# Patient Record
Sex: MALE | Race: WHITE | NOT HISPANIC OR LATINO
[De-identification: names, ages, dates, MRNs, and addresses within clinical notes are randomized per-mention and may not be internally consistent; named-entity substitution may affect disease eponyms.]

---

## 2017-04-04 ENCOUNTER — APPOINTMENT (OUTPATIENT)
Dept: PULMONOLOGY | Facility: CLINIC | Age: 72
End: 2017-04-04

## 2017-04-04 VITALS
OXYGEN SATURATION: 98 % | DIASTOLIC BLOOD PRESSURE: 80 MMHG | HEIGHT: 68 IN | RESPIRATION RATE: 17 BRPM | BODY MASS INDEX: 23.49 KG/M2 | SYSTOLIC BLOOD PRESSURE: 110 MMHG | WEIGHT: 155 LBS | HEART RATE: 77 BPM

## 2017-07-25 ENCOUNTER — APPOINTMENT (OUTPATIENT)
Dept: PULMONOLOGY | Facility: CLINIC | Age: 72
End: 2017-07-25

## 2017-07-25 VITALS
BODY MASS INDEX: 25.27 KG/M2 | DIASTOLIC BLOOD PRESSURE: 80 MMHG | OXYGEN SATURATION: 97 % | SYSTOLIC BLOOD PRESSURE: 110 MMHG | WEIGHT: 161 LBS | HEART RATE: 90 BPM | HEIGHT: 67 IN | RESPIRATION RATE: 15 BRPM

## 2017-10-24 ENCOUNTER — APPOINTMENT (OUTPATIENT)
Dept: PULMONOLOGY | Facility: CLINIC | Age: 72
End: 2017-10-24
Payer: MEDICARE

## 2017-10-24 VITALS
WEIGHT: 161 LBS | OXYGEN SATURATION: 97 % | RESPIRATION RATE: 17 BRPM | HEART RATE: 103 BPM | BODY MASS INDEX: 25.27 KG/M2 | HEIGHT: 67 IN | DIASTOLIC BLOOD PRESSURE: 70 MMHG | SYSTOLIC BLOOD PRESSURE: 120 MMHG

## 2017-10-24 PROCEDURE — 99214 OFFICE O/P EST MOD 30 MIN: CPT | Mod: 25

## 2017-10-24 PROCEDURE — 94010 BREATHING CAPACITY TEST: CPT

## 2018-02-27 ENCOUNTER — APPOINTMENT (OUTPATIENT)
Dept: PULMONOLOGY | Facility: CLINIC | Age: 73
End: 2018-02-27
Payer: MEDICARE

## 2018-02-27 VITALS
OXYGEN SATURATION: 98 % | RESPIRATION RATE: 17 BRPM | SYSTOLIC BLOOD PRESSURE: 120 MMHG | BODY MASS INDEX: 25.27 KG/M2 | HEIGHT: 67 IN | WEIGHT: 161 LBS | HEART RATE: 70 BPM | DIASTOLIC BLOOD PRESSURE: 70 MMHG

## 2018-02-27 PROCEDURE — 94010 BREATHING CAPACITY TEST: CPT | Mod: 59

## 2018-02-27 PROCEDURE — 99214 OFFICE O/P EST MOD 30 MIN: CPT | Mod: 25

## 2018-02-27 PROCEDURE — 94618 PULMONARY STRESS TESTING: CPT

## 2018-06-27 ENCOUNTER — APPOINTMENT (OUTPATIENT)
Dept: PULMONOLOGY | Facility: CLINIC | Age: 73
End: 2018-06-27
Payer: MEDICARE

## 2018-06-27 ENCOUNTER — NON-APPOINTMENT (OUTPATIENT)
Age: 73
End: 2018-06-27

## 2018-06-27 VITALS
WEIGHT: 161 LBS | BODY MASS INDEX: 25.27 KG/M2 | HEART RATE: 85 BPM | DIASTOLIC BLOOD PRESSURE: 80 MMHG | HEIGHT: 67 IN | SYSTOLIC BLOOD PRESSURE: 110 MMHG | OXYGEN SATURATION: 98 % | RESPIRATION RATE: 17 BRPM

## 2018-06-27 PROCEDURE — 94010 BREATHING CAPACITY TEST: CPT

## 2018-06-27 PROCEDURE — 99214 OFFICE O/P EST MOD 30 MIN: CPT | Mod: 25

## 2018-06-27 RX ORDER — METHYLPREDNISOLONE 4 MG/1
4 TABLET ORAL
Qty: 21 | Refills: 0 | Status: DISCONTINUED | COMMUNITY
Start: 2018-01-02

## 2018-06-27 RX ORDER — TRAMADOL HYDROCHLORIDE 50 MG/1
50 TABLET, COATED ORAL
Qty: 30 | Refills: 0 | Status: DISCONTINUED | COMMUNITY
Start: 2018-01-03

## 2018-10-17 ENCOUNTER — APPOINTMENT (OUTPATIENT)
Dept: PULMONOLOGY | Facility: CLINIC | Age: 73
End: 2018-10-17
Payer: MEDICARE

## 2018-10-17 VITALS
DIASTOLIC BLOOD PRESSURE: 73 MMHG | SYSTOLIC BLOOD PRESSURE: 108 MMHG | BODY MASS INDEX: 24.8 KG/M2 | HEIGHT: 67 IN | HEART RATE: 89 BPM | WEIGHT: 158 LBS | OXYGEN SATURATION: 97 %

## 2018-10-17 PROCEDURE — 99214 OFFICE O/P EST MOD 30 MIN: CPT | Mod: 25

## 2018-10-17 PROCEDURE — 94729 DIFFUSING CAPACITY: CPT

## 2018-10-17 PROCEDURE — 94727 GAS DIL/WSHOT DETER LNG VOL: CPT

## 2018-10-17 PROCEDURE — 94010 BREATHING CAPACITY TEST: CPT

## 2018-10-17 NOTE — PHYSICAL EXAM
[General Appearance - Well Developed] : well developed [Normal Appearance] : normal appearance [Well Groomed] : well groomed [General Appearance - Well Nourished] : well nourished [No Deformities] : no deformities [General Appearance - In No Acute Distress] : no acute distress [Normal Conjunctiva] : the conjunctiva exhibited no abnormalities [Eyelids - No Xanthelasma] : the eyelids demonstrated no xanthelasmas [Normal Oropharynx] : normal oropharynx [II] : II [Neck Appearance] : the appearance of the neck was normal [Neck Cervical Mass (___cm)] : no neck mass was observed [Jugular Venous Distention Increased] : there was no jugular-venous distention [Thyroid Diffuse Enlargement] : the thyroid was not enlarged [Thyroid Nodule] : there were no palpable thyroid nodules [Heart Rate And Rhythm] : heart rate and rhythm were normal [Heart Sounds] : normal S1 and S2 [Murmurs] : no murmurs present [Respiration, Rhythm And Depth] : normal respiratory rhythm and effort [Exaggerated Use Of Accessory Muscles For Inspiration] : no accessory muscle use [Auscultation Breath Sounds / Voice Sounds] : lungs were clear to auscultation bilaterally [Abdomen Soft] : soft [Abdomen Tenderness] : non-tender [Abdomen Mass (___ Cm)] : no abdominal mass palpated [Abnormal Walk] : normal gait [Gait - Sufficient For Exercise Testing] : the gait was sufficient for exercise testing [Nail Clubbing] : no clubbing of the fingernails [Cyanosis, Localized] : no localized cyanosis [Petechial Hemorrhages (___cm)] : no petechial hemorrhages [Skin Color & Pigmentation] : normal skin color and pigmentation [] : no rash [No Venous Stasis] : no venous stasis [Skin Lesions] : no skin lesions [No Skin Ulcers] : no skin ulcer [No Xanthoma] : no  xanthoma was observed [Deep Tendon Reflexes (DTR)] : deep tendon reflexes were 2+ and symmetric [Sensation] : the sensory exam was normal to light touch and pinprick [No Focal Deficits] : no focal deficits [Oriented To Time, Place, And Person] : oriented to person, place, and time [Impaired Insight] : insight and judgment were intact [Affect] : the affect was normal [FreeTextEntry1] : I:E ratio 1:3; clear

## 2018-10-17 NOTE — REASON FOR VISIT
[Follow-Up] : a follow-up visit [FreeTextEntry1] : Afib, atrial flutter, heart disease, SERAFIN, PAH, RAD, SOB,

## 2018-10-17 NOTE — ADDENDUM
[FreeTextEntry1] : All medical record entries made by elana Mckeon were at Dr. Pato Knight's, direction and personally dictated by me on (10/17/2018). I have reviewed the chart and agree that the record accurately reflects my personal performance of the history, physical exam, assessment and plan. I have also personally directed, reviewed, and agree with the discharge instructions.

## 2018-10-17 NOTE — PROCEDURE
[FreeTextEntry1] : PFT - spi reveals normal flows; FEV1 is 3.16 which is 102% of predicted, normal flow volume loop. Diffusion reveals as normal with a DLCO of 19.6 which is 108% of predicted.\par \par ull pft\par normal\par fec 3.16 102%\par lv normal\par \par diffusion normal 19.6 108% with normal flow volume loop

## 2018-10-17 NOTE — ASSESSMENT
[FreeTextEntry1] : Mr. Knapp is doing well from a pulmonary perspective. His SOB is currently stable and is multifactorial due to:\par -exercise\par -PAH\par -RADS\par -?cardiac disease\par -poor mechanics of breathing\par \par problem 1: history of PAH, exercise induced \par -add Xopenex trial 2 puffs pre exercise\par -previously failed: Spiriva, Incruse, StioltoRevatio, Adcirca, Calcium channel blockers, and Tracleer\par -considering Uptavi (IP Antagonist)\par \par -Disorder of the pulmonary arteries, important to distinguish the difference between pulmonary arterial hypertension which is idiopathic to secondary pulmonary hypertension which is related to heart disease being diastolic dysfunction or congestive heart failure. Diagnostics include an initial echocardiogram evaluating the pulmonary artery pressures, if this is abnormal, to proceed with a VQ scan as well as a CTPA and an eventual right heart catheterization (No medication can be prescribed until the right heart catheterization). If present, the evaluation will include rheumatological blood testing, HIV testing, and potential evaluation for cirrhosis. Drug classes include PED5 (Revatio, Adcirca) ETRA (Tracleer, Macitentan, Letairis), Soluble guanylate cyclase (Adempas) Prostacyclins (Uptravi, Tyavso, Ventavis, Remodulin, or Orenitram derivatives). \par \par problem 2: RADS; \par -add trial of Spiriva 1 inhalation QD for 1 week, trial of Incruse 1 inhalation daily for 1 week, trial of Stiolto 2 inhalations daily for 1 week\par -failed Utibron,Bevespi and Seebri\par -continue to use Ventolin PRN and before exercise\par \par -Inhaler technique reviewed as well as oral hygiene techniques reviewed with patient. Avoidance of cold air, extremes of temperature, rescue inhaler should be used before exercise. Order of medication reviewed with patient. Recommended use of a cool mist humidifier in the bedroom. \par \par problem 3: prior CTA\par -ruled out pulmonary embolism and any underlying disease\par \par problem 4: cardiac component \par -continue to follow up with cardiologist\par -his recent pulmonary cardiac stress test was normal\par \par problem 5: SERAFIN\par -continue to use Oxy-Aid by Respitec\par -recommended to try Boron supplements\par -Sleep apnea is associated with adverse clinical consequences which an affect most organ systems. Cardiovascular disease risk includes arrhythmias, atrial fibrillation, hypertension, coronary artery disease, and stroke. Metabolic disorders include diabetes type 2, non-alcoholic fatty liver disease. Mood disorder especially depression; and cognitive decline especially in the elderly. Associations with chronic reflux/Marques’s esophagus some but not all inclusive.\par -Reasons include arousal consistent with hypopnea; respiratory events most prominent in REM sleep or supine position; therefore sleep staging and body position are important for accurate diagnosis and estimation of AHI. \par -Treatment options discussed including CPAP/BiPAP machine, oral appliance, ProVent therapy, Oxy-Aid by Respitec, new technologies, or positional sleep.Recommended use of the CPAP machine for moderate (AHI >15), moderate to severe (AHI 15-30) and severe patients (AHI > 30). Recommended weight loss which can reduce AHI especially in weight loss of greater than 5% of BMI. Positional sleep is recommended in those with low AHI, low-moderate BMI, and younger age. For severe sleep apnea, the hypoglossal nerve stimulator was recommended as well. \par \par problem 6: exercise\par -recommended to transition from treadmill/swimming/tread water\par -recommended to use natural protein\par \par problem 7: exercise induced pulmonary hypertension\par -he may qualify for prostacyclin drugs including Uptavi or Orenitram \par -considering Uptavi\par \par Problem 8: muscle cramps\par -continue MyoSedate\par -continue to use Optimal Electrolytes (pre-exercise)\par -recommended Topricin ointment \par \par problem 9: poor mechanics of breathing\par -Proper breathing techniques were reviewed with an emphasis of exhalation. Patient instructed to breath in for 1 second and out for four seconds. Patient was encouraged to not talk while walking. \par \par -recommended to hydrate before exercise\par -recommended to use a heart rate monitor while exercising\par -recommended to use a natural protein powder \par \par problem 10: health maintenance \par -recommended to try optimal electrolytes\par -recommended yearly flu shot after October 15, refused today\par -recommended strep pneumonia vaccines: Prevnar-13 vaccine, followed by Pneumo vaccine 23 one year following\par -recommended early intervention for URIs\par -recommended regular osteoporosis evaluations\par -recommended early dermatological evaluations\par -recommended after the age of 50 to the age of 70, colonoscopy every 5 years \par  \par F/U in 4 months \par He is encouraged to call with any changes, concerns, or questions. \par

## 2019-02-20 ENCOUNTER — APPOINTMENT (OUTPATIENT)
Dept: PULMONOLOGY | Facility: CLINIC | Age: 74
End: 2019-02-20
Payer: MEDICARE

## 2019-02-20 ENCOUNTER — NON-APPOINTMENT (OUTPATIENT)
Age: 74
End: 2019-02-20

## 2019-02-20 VITALS
BODY MASS INDEX: 25.11 KG/M2 | SYSTOLIC BLOOD PRESSURE: 110 MMHG | OXYGEN SATURATION: 99 % | HEART RATE: 90 BPM | HEIGHT: 67 IN | WEIGHT: 160 LBS | RESPIRATION RATE: 17 BRPM | DIASTOLIC BLOOD PRESSURE: 70 MMHG

## 2019-02-20 PROCEDURE — 99214 OFFICE O/P EST MOD 30 MIN: CPT | Mod: 25

## 2019-02-20 PROCEDURE — 94010 BREATHING CAPACITY TEST: CPT

## 2019-02-20 NOTE — HISTORY OF PRESENT ILLNESS
[FreeTextEntry1] : Mr. Auguste is a 73 year old male with a history of Afib, atrial flutter, heart disease, SERAFIN, PAH, RAD, SOB, who now comes in for a follow up visit. His chief complaint is fatigue. \par - He reports getting tired a lot during the day recently. \par - He continues to have trouble sleeping as he usually sleeps 3 hours before getting up. \par - He uses theraworx for muscle relief. \par - He reports having a cold for about 2 weeks. \par - He exercises regularly but does report a 'funny feeling' when his pulse rises. \par - He denies any headaches, nausea, vomiting, fever, chills, sweats, chest pain, diarrhea, constipation, dysphagia, leg swelling, leg pain, itchy eyes, itchy ears, heartburn, reflux, or sour taste in the mouth.

## 2019-02-20 NOTE — ADDENDUM
[FreeTextEntry1] : Documented by Isaac Ch acting as a scribe for Dr. Pato Knight on 2/20/19.\par \par All medical record entries made by the Scribe were at my, Dr. Pato Knight's, direction and personally dictated by me on 2/20/19. I have reviewed the chart and agree that the record accurately reflects my personal performance of the history, physical exam, assessment and plan. I have also personally directed, reviewed, and agree with the discharge instructions.\par

## 2019-02-20 NOTE — PROCEDURE
[FreeTextEntry1] : PFT- spi reveals super normal flows; FEV1 3.50 was L which is 130% of predicted; normal flow volume loop\par

## 2019-02-20 NOTE — ASSESSMENT
[FreeTextEntry1] : Mr. Knapp has a h/o AF/flutter, RAD, RAH, and SERAFIN who is doing well from a pulmonary perspective. His SOB is currently stable and is multifactorial due to:\par -exercise\par -PAH\par -RADS\par -?cardiac disease\par -poor mechanics of breathing\par \par problem 1: history of PAH, exercise induced \par -add Xopenex trial 2 puffs pre exercise\par -previously failed: Spiriva, Incruse, StioltoRevatio, Adcirca, Calcium channel blockers, and Tracleer\par -considering Uptavi (IP Antagonist)\par \par -Disorder of the pulmonary arteries, important to distinguish the difference between pulmonary arterial hypertension which is idiopathic to secondary pulmonary hypertension which is related to heart disease being diastolic dysfunction or congestive heart failure. Diagnostics include an initial echocardiogram evaluating the pulmonary artery pressures, if this is abnormal, to proceed with a VQ scan as well as a CTPA and an eventual right heart catheterization (No medication can be prescribed until the right heart catheterization). If present, the evaluation will include rheumatological blood testing, HIV testing, and potential evaluation for cirrhosis. Drug classes include PED5 (Revatio, Adcirca) ETRA (Tracleer, Macitentan, Letairis), Soluble guanylate cyclase (Adempas) Prostacyclins (Uptravi, Tyavso, Ventavis, Remodulin, or Orenitram derivatives). \par \par problem 2: RADS; \par -add trial of Spiriva 1 inhalation QD for 1 week, trial of Incruse 1 inhalation daily for 1 week, trial of Stiolto 2 inhalations daily for 1 week\par -failed Utibron,Bevespi and Seebri\par -continue to use Ventolin PRN and before exercise\par \par -Inhaler technique reviewed as well as oral hygiene techniques reviewed with patient. Avoidance of cold air, extremes of temperature, rescue inhaler should be used before exercise. Order of medication reviewed with patient. Recommended use of a cool mist humidifier in the bedroom. \par \par problem 3: prior CTA\par -ruled out pulmonary embolism and any underlying disease\par \par problem 4: cardiac component \par -continue to follow up with cardiologist\par -his recent pulmonary cardiac stress test was normal\par \par problem 5: SERAFIN\par -continue to use Oxy-Aid by Respitec\par -recommended to try Boron supplements 6 mg BID\par -Sleep apnea is associated with adverse clinical consequences which an affect most organ systems. Cardiovascular disease risk includes arrhythmias, atrial fibrillation, hypertension, coronary artery disease, and stroke. Metabolic disorders include diabetes type 2, non-alcoholic fatty liver disease. Mood disorder especially depression; and cognitive decline especially in the elderly. Associations with chronic reflux/Marques’s esophagus some but not all inclusive.\par -Reasons include arousal consistent with hypopnea; respiratory events most prominent in REM sleep or supine position; therefore sleep staging and body position are important for accurate diagnosis and estimation of AHI. \par -Treatment options discussed including CPAP/BiPAP machine, oral appliance, ProVent therapy, Oxy-Aid by Respitec, new technologies, or positional sleep.Recommended use of the CPAP machine for moderate (AHI >15), moderate to severe (AHI 15-30) and severe patients (AHI > 30). Recommended weight loss which can reduce AHI especially in weight loss of greater than 5% of BMI. Positional sleep is recommended in those with low AHI, low-moderate BMI, and younger age. For severe sleep apnea, the hypoglossal nerve stimulator was recommended as well. \par \par problem 6: exercise\par -recommended to transition from treadmill/swimming/tread water\par -recommended to use natural protein\par \par problem 7: exercise induced pulmonary hypertension\par -he may qualify for prostacyclin drugs including Uptavi or Orenitram \par -considering Uptavi\par \par Problem 8: muscle cramps\par -continue theraworx \par -continue to use Optimal Electrolytes (pre-exercise)\par -recommended Topricin ointment \par \par problem 9: poor mechanics of breathing\par -Proper breathing techniques were reviewed with an emphasis of exhalation. Patient instructed to breath in for 1 second and out for four seconds. Patient was encouraged to not talk while walking. \par \par -recommended to hydrate before exercise\par -recommended to use a heart rate monitor while exercising\par -recommended to use a natural protein powder \par \par problem 10: health maintenance \par -recommended to try optimal electrolytes \par -recommended yearly flu shot after October 15, refused \par -recommended strep pneumonia vaccines: Prevnar-13 vaccine, followed by Pneumo vaccine 23 one year following\par -recommended early intervention for URIs\par -recommended regular osteoporosis evaluations\par -recommended early dermatological evaluations\par -recommended after the age of 50 to the age of 70, colonoscopy every 5 years \par - recommend Vitamin D supplements \par - recommend probiotic\par  \par F/U in 4 months \par He is encouraged to call with any changes, concerns, or questions. \par

## 2019-02-21 ENCOUNTER — TRANSCRIPTION ENCOUNTER (OUTPATIENT)
Age: 74
End: 2019-02-21

## 2019-04-26 ENCOUNTER — TRANSCRIPTION ENCOUNTER (OUTPATIENT)
Age: 74
End: 2019-04-26

## 2019-05-10 ENCOUNTER — RECORD ABSTRACTING (OUTPATIENT)
Age: 74
End: 2019-05-10

## 2019-05-11 DIAGNOSIS — I47.1 SUPRAVENTRICULAR TACHYCARDIA: ICD-10-CM

## 2019-05-11 DIAGNOSIS — R55 SYNCOPE AND COLLAPSE: ICD-10-CM

## 2019-05-11 DIAGNOSIS — D75.89 OTHER SPECIFIED DISEASES OF BLOOD AND BLOOD-FORMING ORGANS: ICD-10-CM

## 2019-05-11 DIAGNOSIS — Z87.09 PERSONAL HISTORY OF OTHER DISEASES OF THE RESPIRATORY SYSTEM: ICD-10-CM

## 2019-05-11 DIAGNOSIS — L98.9 DISORDER OF THE SKIN AND SUBCUTANEOUS TISSUE, UNSPECIFIED: ICD-10-CM

## 2019-05-11 DIAGNOSIS — Z86.79 PERSONAL HISTORY OF OTHER DISEASES OF THE CIRCULATORY SYSTEM: ICD-10-CM

## 2019-05-11 DIAGNOSIS — Z86.69 PERSONAL HISTORY OF OTHER DISEASES OF THE NERVOUS SYSTEM AND SENSE ORGANS: ICD-10-CM

## 2019-05-11 DIAGNOSIS — Z00.00 ENCOUNTER FOR GENERAL ADULT MEDICAL EXAMINATION W/OUT ABNORMAL FINDINGS: ICD-10-CM

## 2019-05-11 DIAGNOSIS — I25.10 ATHEROSCLEROTIC HEART DISEASE OF NATIVE CORONARY ARTERY W/OUT ANGINA PECTORIS: ICD-10-CM

## 2019-05-11 DIAGNOSIS — Z86.39 PERSONAL HISTORY OF OTHER ENDOCRINE, NUTRITIONAL AND METABOLIC DISEASE: ICD-10-CM

## 2019-05-11 DIAGNOSIS — I45.5 OTHER SPECIFIED HEART BLOCK: ICD-10-CM

## 2019-05-11 DIAGNOSIS — M48.02 SPINAL STENOSIS, CERVICAL REGION: ICD-10-CM

## 2019-05-15 ENCOUNTER — APPOINTMENT (OUTPATIENT)
Dept: ELECTROPHYSIOLOGY | Facility: CLINIC | Age: 74
End: 2019-05-15
Payer: MEDICARE

## 2019-05-15 VITALS
HEART RATE: 107 BPM | BODY MASS INDEX: 24.55 KG/M2 | WEIGHT: 162 LBS | SYSTOLIC BLOOD PRESSURE: 124 MMHG | HEIGHT: 68 IN | DIASTOLIC BLOOD PRESSURE: 80 MMHG

## 2019-05-15 PROCEDURE — 93000 ELECTROCARDIOGRAM COMPLETE: CPT

## 2019-05-15 PROCEDURE — 99204 OFFICE O/P NEW MOD 45 MIN: CPT

## 2019-05-15 NOTE — REVIEW OF SYSTEMS
[Fever] : no fever [Chills] : no chills [Feeling Fatigued] : feeling fatigued [see HPI] : see HPI [Shortness Of Breath] : no shortness of breath [Dyspnea on exertion] : not dyspnea during exertion [Chest Pain] : no chest pain [Lower Ext Edema] : no extremity edema [Palpitations] : no palpitations [Dizziness] : dizziness [Negative] : Heme/Lymph

## 2019-05-15 NOTE — HISTORY OF PRESENT ILLNESS
[FreeTextEntry1] : 73 year old gentleman with history of pulmonary fibrosis and pulmonary hypertension, sleep apnea, persistent atrial fibrillation, and atrial flutter s/p ablation in 2012 (Dr Olguin) presenting for evaluation of bradycardia. At time of his flutter ablation paroxysms of AF were also noted, and he subsequently developed persistent atrial fibrillation. Holter monitor in 8/2016 revealed persistent atrial fibrillation/flutter with average HR 81 bpm (range  bpm). He has not been on anticoagulation as he has refused, as he believes he had increased infectious and bleeding risks. A Holter monitor 3/13/19 revealed persistent atrial fibrillation (average rate 85 bpm, range  bpm), NSVT up to 4 beat, and a ventricular pause of 4 seconds, which was associated with lightheadedness (though pt now denies).  He is now referred for consideration of ppm implant. \par He reports occasional episodes of sudden lightheadedness which can last for up to 10 seconds, and has on a couple occasions been associated with presyncope, but no lizandro syncope. He feels fatigued, and has gradually worsening exertional dyspnea, but does continue to try to run, which he can not do as vigorously and previously. \par He has had a normal stress test and normal LV function on TTE.\par Of note, he does not believe he has been in persistent AF all the time, and states that as an  he understands his electrical system and is somewhat distrustful of the medical advice he has received.  \par Current medication includes ASA 81mg qd only. \par

## 2019-05-15 NOTE — REASON FOR VISIT
[Atrial Fibrillation] : atrial fibrillation [Consultation] : a consultation regarding [FreeTextEntry1] : ref Dr Wilkinson

## 2019-05-15 NOTE — DISCUSSION/SUMMARY
[FreeTextEntry1] : 73 year old gentleman with history of pulmonary fibrosis and pulmonary hypertension, sleep apnea, persistent atrial fibrillation, and atrial flutter s/p ablation in 2012 (Dr Olguin) presenting for evaluation of bradycardia. By records he has had persistent AF for several years, and has not been on rate control. Though his heart rates are generally controlled he does have periods of significant tachycardia and significant bradycardia, and has had a pause of 4 seconds noted on Holter. He does have periods of dizziness and presyncope, which may be related to such pauses and bradycardia. I therefore believe a ppm (as well as some rate control) would be reasonable if pt is agreeable. I also suggested consideration of ILR implant for long-term HR monitoring and correlation with his symptoms, to clarify that his dizziness is related to periodic bradycardia. He does not want to proceed with these options at this time, and states he will monitor his symptoms and return as needed.\par We also discussed the thromboembolic risk (including risk of major stroke) associated with AF, and he does have an elevated risk (CHADSVASc at least 2). We reviewed options for anticoagulation as well as left atrial appendage occlusion, and risks and benefits of these approaches. He expressed understanding, but does still want to continue without anticoagulation. \par -EP followup as needed if pt agreeable. \par

## 2019-05-15 NOTE — PHYSICAL EXAM
[General Appearance - Well Developed] : well developed [General Appearance - In No Acute Distress] : no acute distress [General Appearance - Well Nourished] : well nourished [Normal Oral Mucosa] : normal oral mucosa [Normal Conjunctiva] : the conjunctiva exhibited no abnormalities [Normal Oropharynx] : normal oropharynx [Normal Jugular Venous V Waves Present] : normal jugular venous V waves present [Heart Sounds] : normal S1 and S2 [Murmurs] : no murmurs present [Edema] : no peripheral edema present [Arterial Pulses Normal] : the arterial pulses were normal [Bradycardic ___] : the heart rate was bradycardic at [unfilled] bpm [Respiration, Rhythm And Depth] : normal respiratory rhythm and effort [] : no respiratory distress [Bowel Sounds] : normal bowel sounds [FreeTextEntry1] : coarse breath sounds [Abdomen Soft] : soft [Abnormal Walk] : normal gait [Abdomen Tenderness] : non-tender [Skin Color & Pigmentation] : normal skin color and pigmentation [Cyanosis, Localized] : no localized cyanosis [Skin Turgor] : normal skin turgor [Nail Clubbing] : no clubbing of the fingernails [Oriented To Time, Place, And Person] : oriented to person, place, and time

## 2019-06-26 ENCOUNTER — APPOINTMENT (OUTPATIENT)
Dept: PULMONOLOGY | Facility: CLINIC | Age: 74
End: 2019-06-26
Payer: MEDICARE

## 2019-06-26 ENCOUNTER — NON-APPOINTMENT (OUTPATIENT)
Age: 74
End: 2019-06-26

## 2019-06-26 VITALS
WEIGHT: 160 LBS | OXYGEN SATURATION: 99 % | DIASTOLIC BLOOD PRESSURE: 60 MMHG | HEIGHT: 68 IN | SYSTOLIC BLOOD PRESSURE: 110 MMHG | HEART RATE: 93 BPM | BODY MASS INDEX: 24.25 KG/M2 | RESPIRATION RATE: 17 BRPM

## 2019-06-26 DIAGNOSIS — I48.92 UNSPECIFIED ATRIAL FLUTTER: ICD-10-CM

## 2019-06-26 PROCEDURE — 99214 OFFICE O/P EST MOD 30 MIN: CPT | Mod: 25

## 2019-06-26 PROCEDURE — 94618 PULMONARY STRESS TESTING: CPT

## 2019-06-26 PROCEDURE — 94010 BREATHING CAPACITY TEST: CPT

## 2019-06-26 NOTE — HISTORY OF PRESENT ILLNESS
[FreeTextEntry1] : Mr. Knapp is a 73 year old male with a history of allergic rhinitis, SERAFIN, PAH, RAD, and SOB presenting to the office today for a follow up visit. His chief complaint is difficulty exercising / SOB\par -he states that he has been trying to exercise more with running and swimming. he states that his heart rate will become elevated after running for a short time, and his chest will become heavy / tight  \par -he states that his bowels have been regular, and he has been staying well hydrated\par -he reports that his sense of taste and smell have been good \par -he does not believe that any of his inhalers helped his breathing\par -he has been sleeping for about 5 hours per night\par -he denies any headaches, nausea, vomiting, fever, chills, sweats, chest pain, diarrhea, constipation, dysphagia, dizziness, leg swelling, leg pain, itchy eyes, itchy ears, heartburn, reflux, or sour taste in the mouth.

## 2019-06-26 NOTE — ADDENDUM
[FreeTextEntry1] : All medical record entries made by elana Gallagher were at Dr. Pato Knight's, direction and personally dictated by me on 06/26/2019. I have reviewed the chart and agree that the record accurately reflects my personal performance of the history, physical exam, assessment and plan. I have also personally directed, reviewed, and agree with the discharge instructions.

## 2019-06-26 NOTE — PHYSICAL EXAM
[General Appearance - Well Developed] : well developed [Well Groomed] : well groomed [Normal Appearance] : normal appearance [General Appearance - Well Nourished] : well nourished [No Deformities] : no deformities [Normal Conjunctiva] : the conjunctiva exhibited no abnormalities [General Appearance - In No Acute Distress] : no acute distress [Eyelids - No Xanthelasma] : the eyelids demonstrated no xanthelasmas [Normal Oropharynx] : normal oropharynx [Neck Cervical Mass (___cm)] : no neck mass was observed [Jugular Venous Distention Increased] : there was no jugular-venous distention [Neck Appearance] : the appearance of the neck was normal [Heart Rate And Rhythm] : heart rate and rhythm were normal [Thyroid Diffuse Enlargement] : the thyroid was not enlarged [Thyroid Nodule] : there were no palpable thyroid nodules [Heart Sounds] : normal S1 and S2 [Murmurs] : no murmurs present [Exaggerated Use Of Accessory Muscles For Inspiration] : no accessory muscle use [Respiration, Rhythm And Depth] : normal respiratory rhythm and effort [Auscultation Breath Sounds / Voice Sounds] : lungs were clear to auscultation bilaterally [Abdomen Soft] : soft [Abdomen Tenderness] : non-tender [Abnormal Walk] : normal gait [Abdomen Mass (___ Cm)] : no abdominal mass palpated [Gait - Sufficient For Exercise Testing] : the gait was sufficient for exercise testing [Nail Clubbing] : no clubbing of the fingernails [Cyanosis, Localized] : no localized cyanosis [Petechial Hemorrhages (___cm)] : no petechial hemorrhages [Skin Color & Pigmentation] : normal skin color and pigmentation [No Venous Stasis] : no venous stasis [] : no rash [No Skin Ulcers] : no skin ulcer [Skin Lesions] : no skin lesions [No Xanthoma] : no  xanthoma was observed [No Focal Deficits] : no focal deficits [Sensation] : the sensory exam was normal to light touch and pinprick [Deep Tendon Reflexes (DTR)] : deep tendon reflexes were 2+ and symmetric [Oriented To Time, Place, And Person] : oriented to person, place, and time [Impaired Insight] : insight and judgment were intact [Affect] : the affect was normal [II] : II [FreeTextEntry1] : I:E ratio 1:3; clear

## 2019-06-26 NOTE — ASSESSMENT
[FreeTextEntry1] : Mr. Knapp has a h/o AF/flutter, RAD, RAH, and SERAFIN who is more compromised from a pulmonary perspective. \par \par His progressive SOB is currently stable and is multifactorial due to:\par -exercise\par -PAH\par -RADS\par -?cardiac disease\par -poor mechanics of breathing\par \par problem 1: history of PAH, exercise induced \par -continue Xopenex trial 2 puffs pre exercise\par -previously failed: Spiriva, Incruse, StioltoRevatio, Adcirca, Calcium channel blockers, and Tracleer\par -considering Uptavi (IP Antagonist)\par \par -Disorder of the pulmonary arteries, important to distinguish the difference between pulmonary arterial hypertension which is idiopathic to secondary pulmonary hypertension which is related to heart disease being diastolic dysfunction or congestive heart failure. Diagnostics include an initial echocardiogram evaluating the pulmonary artery pressures, if this is abnormal, to proceed with a VQ scan as well as a CTPA and an eventual right heart catheterization (No medication can be prescribed until the right heart catheterization). If present, the evaluation will include rheumatological blood testing, HIV testing, and potential evaluation for cirrhosis. Drug classes include PED5 (Revatio, Adcirca) ETRA (Tracleer, Macitentan, Letairis), Soluble guanylate cyclase (Adempas) Prostacyclins (Uptravi, Tyavso, Ventavis, Remodulin, or Orenitram derivatives). \par \par problem 2: RADS; \par -s/p Spiriva 1 inhalation QD for 1 week, trial of Incruse 1 inhalation daily for 1 week, trial of Stiolto 2 inhalations daily for 1 week\par -failed Utibron,Bevespi, Seebri, Spiriva, Incruse, Aiollo \par -continue to use Ventolin PRN and before exercise\par \par -Inhaler technique reviewed as well as oral hygiene techniques reviewed with patient. Avoidance of cold air, extremes of temperature, rescue inhaler should be used before exercise. Order of medication reviewed with patient. Recommended use of a cool mist humidifier in the bedroom. \par \par problem 3: prior CTA\par -ruled out pulmonary embolism and any underlying disease\par \par problem 4: cardiac component \par -continue to follow up with cardiologist - repeat cardiopulmonary stress test \par -his recent pulmonary cardiac stress test was normal\par \par problem 5: SERAFIN\par -continue to use Oxy-Aid by [facility]\par -recommended to try Boron supplements 6 mg BID\par -Sleep apnea is associated with adverse clinical consequences which an affect most organ systems. Cardiovascular disease risk includes arrhythmias, atrial fibrillation, hypertension, coronary artery disease, and stroke. Metabolic disorders include diabetes type 2, non-alcoholic fatty liver disease. Mood disorder especially depression; and cognitive decline especially in the elderly. Associations with chronic reflux/Marques’s esophagus some but not all inclusive.\par -Reasons include arousal consistent with hypopnea; respiratory events most prominent in REM sleep or supine position; therefore sleep staging and body position are important for accurate diagnosis and estimation of AHI. \par -Treatment options discussed including CPAP/BiPAP machine, oral appliance, ProVent therapy, Oxy-Aid by [facility], new technologies, or positional sleep.Recommended use of the CPAP machine for moderate (AHI >15), moderate to severe (AHI 15-30) and severe patients (AHI > 30). Recommended weight loss which can reduce AHI especially in weight loss of greater than 5% of BMI. Positional sleep is recommended in those with low AHI, low-moderate BMI, and younger age. For severe sleep apnea, the hypoglossal nerve stimulator was recommended as well. \par \par problem 6: exercise\par -recommended to transition from treadmill/swimming/tread water\par -recommended to use natural protein\par \par problem 7: exercise induced pulmonary hypertension\par -he may qualify for prostacyclin drugs including Uptavi or Orenitram \par -considering Uptavi (pending cardiopulmonary exercise stress test)\par \par Problem 8: muscle cramps (improved)\par -continue theraworx \par -continue to use Optimal Electrolytes (pre-exercise)\par -recommended Topricin ointment \par \par problem 9: poor mechanics of breathing\par -Proper breathing techniques were reviewed with an emphasis of exhalation. Patient instructed to breath in for 1 second and out for four seconds. Patient was encouraged to not talk while walking. \par \par -recommended to hydrate before exercise\par -recommended to use a heart rate monitor while exercising\par -recommended to use a natural protein powder \par \par problem 10: health maintenance \par -recommended to try optimal electrolytes \par -recommended yearly flu shot after October 15, refused \par -recommended strep pneumonia vaccines: Prevnar-13 vaccine, followed by Pneumo vaccine 23 one year following\par -recommended early intervention for URIs\par -recommended regular osteoporosis evaluations\par -recommended early dermatological evaluations\par -recommended after the age of 50 to the age of 70, colonoscopy every 5 years \par - recommend Vitamin D supplements \par - recommend probiotic\par  \par F/U in 4 months \par He is encouraged to call with any changes, concerns, or questions. \par

## 2019-06-26 NOTE — PROCEDURE
[FreeTextEntry1] : PFT - spi reveals normal flows; FEV1 is 3.57 which is 132% of predicted, normal flow volume loop\par \par 6 minute walk test reveals a low saturation of 97% with no evidence of dyspnea or fatigue; walked 472.7 meters \par

## 2019-07-08 ENCOUNTER — APPOINTMENT (OUTPATIENT)
Dept: PULMONOLOGY | Facility: CLINIC | Age: 74
End: 2019-07-08
Payer: MEDICARE

## 2019-07-08 PROCEDURE — 94621 CARDIOPULM EXERCISE TESTING: CPT

## 2019-07-08 PROCEDURE — 94375 RESPIRATORY FLOW VOLUME LOOP: CPT

## 2019-09-26 ENCOUNTER — APPOINTMENT (OUTPATIENT)
Dept: PULMONOLOGY | Facility: CLINIC | Age: 74
End: 2019-09-26
Payer: MEDICARE

## 2019-09-26 ENCOUNTER — NON-APPOINTMENT (OUTPATIENT)
Age: 74
End: 2019-09-26

## 2019-09-26 VITALS
HEART RATE: 67 BPM | BODY MASS INDEX: 25.55 KG/M2 | SYSTOLIC BLOOD PRESSURE: 120 MMHG | OXYGEN SATURATION: 97 % | HEIGHT: 66 IN | DIASTOLIC BLOOD PRESSURE: 70 MMHG | RESPIRATION RATE: 17 BRPM | WEIGHT: 159 LBS

## 2019-09-26 PROCEDURE — 99214 OFFICE O/P EST MOD 30 MIN: CPT | Mod: 25

## 2019-09-26 PROCEDURE — 94010 BREATHING CAPACITY TEST: CPT

## 2019-09-26 NOTE — REVIEW OF SYSTEMS
[Negative] : Sleep Disorder [Dizziness] : dizziness [As Noted in HPI] : as noted in HPI [Nasal Congestion] : no nasal congestion [Postnasal Drip] : no postnasal drip [Sinus Problems] : no sinus problems [Chest Discomfort] : no chest discomfort [Heartburn] : no heartburn [Reflux] : no reflux [Constipation] : no constipation [Dysphagia] : no dysphagia [Diarrhea] : no diarrhea

## 2019-09-26 NOTE — ASSESSMENT
[FreeTextEntry1] : Mr. Knapp has a h/o AF/flutter, macular pucker, allergy, low vitamin D, RAD, RAH, and SERAFIN who is now mildly compromised from a pulmonary perspective. \par \par His progressive SOB is currently stable and is multifactorial due to:\par -exercise\par -PAH\par -RADS\par -?cardiac disease\par -poor mechanics of breathing\par \par problem 1: history of PAH, exercise induced \par -continue Xopenex trial 2 puffs pre exercise, PRN\par -previously failed: Spiriva, Incruse, StioltoRevatio, Adcirca, Calcium channel blockers, and Tracleer\par -considering Uptavi (IP Antagonist)\par \par -Disorder of the pulmonary arteries, important to distinguish the difference between pulmonary arterial hypertension which is idiopathic to secondary pulmonary hypertension which is related to heart disease being diastolic dysfunction or congestive heart failure. Diagnostics include an initial echocardiogram evaluating the pulmonary artery pressures, if this is abnormal, to proceed with a VQ scan as well as a CTPA and an eventual right heart catheterization (No medication can be prescribed until the right heart catheterization). If present, the evaluation will include rheumatological blood testing, HIV testing, and potential evaluation for cirrhosis. Drug classes include PED5 (Revatio, Adcirca) ETRA (Tracleer, Macitentan, Letairis), Soluble guanylate cyclase (Adempas) Prostacyclins (Uptravi, Tyavso, Ventavis, Remodulin, or Orenitram derivatives). \par \par problem 2: RADS; \par -s/p Spiriva 1 inhalation QD for 1 week, trial of Incruse 1 inhalation daily for 1 week, trial of Stiolto 2 inhalations daily for 1 week (failed)\par -failed Utibron,Bevespi, Seebri, Spiriva, Incruse, Aiollo \par -continue to use Ventolin PRN and before exercise, PRN\par \par -Inhaler technique reviewed as well as oral hygiene techniques reviewed with patient. Avoidance of cold air, extremes of temperature, rescue inhaler should be used before exercise. Order of medication reviewed with patient. Recommended use of a cool mist humidifier in the bedroom. \par \par problem 3: prior CTA\par -ruled out pulmonary embolism and any underlying disease\par \par problem 4: cardiac component \par -continue to follow up with cardiologist - repeat cardiopulmonary stress test \par -his recent pulmonary cardiac stress test was normal\par \par problem 5: SERAFIN\par -continue to use Oxy-Aid by [facility]\par -recommended to try Boron supplements 6 mg BID\par -Sleep apnea is associated with adverse clinical consequences which an affect most organ systems. Cardiovascular disease risk includes arrhythmias, atrial fibrillation, hypertension, coronary artery disease, and stroke. Metabolic disorders include diabetes type 2, non-alcoholic fatty liver disease. Mood disorder especially depression; and cognitive decline especially in the elderly. Associations with chronic reflux/Marques’s esophagus some but not all inclusive.\par -Reasons include arousal consistent with hypopnea; respiratory events most prominent in REM sleep or supine position; therefore sleep staging and body position are important for accurate diagnosis and estimation of AHI. \par -Treatment options discussed including CPAP/BiPAP machine, oral appliance, ProVent therapy, Oxy-Aid by [facility], new technologies, or positional sleep.Recommended use of the CPAP machine for moderate (AHI >15), moderate to severe (AHI 15-30) and severe patients (AHI > 30). Recommended weight loss which can reduce AHI especially in weight loss of greater than 5% of BMI. Positional sleep is recommended in those with low AHI, low-moderate BMI, and younger age. For severe sleep apnea, the hypoglossal nerve stimulator was recommended as well. \par \par problem 6: exercise\par -recommended to transition from treadmill/swimming/tread water\par -recommended to use natural protein\par \par problem 7: exercise induced pulmonary hypertension\par -he may qualify for prostacyclin drugs including Uptavi or Orenitram \par -considering Uptavi (pending cardiopulmonary exercise stress test)\par \par Problem 8: muscle cramps (improved)\par -continue theraworx \par -continue to use Optimal Electrolytes (pre-exercise)\par -recommended Topricin ointment \par \par problem 9: poor mechanics of breathing\par -Proper breathing techniques were reviewed with an emphasis of exhalation. Patient instructed to breath in for 1 second and out for four seconds. Patient was encouraged to not talk while walking. \par \par -recommended to hydrate before exercise\par -recommended to use a heart rate monitor while exercising\par -recommended to use a natural protein powder \par \par problem 10: health maintenance \par -Recommended to take Turmeric\par -Recommended to take Lobelia/Iobelia\par -recommended to try optimal electrolytes \par -recommended yearly flu shot after October 15, refused \par -recommended strep pneumonia vaccines: Prevnar-13 vaccine, followed by Pneumo vaccine 23 one year following\par -recommended early intervention for URIs\par -recommended regular osteoporosis evaluations\par -recommended early dermatological evaluations\par -recommended after the age of 50 to the age of 70, colonoscopy every 5 years \par - recommend Vitamin D supplements \par - recommend probiotic\par  \par F/U in 4 months \par He is encouraged to call with any changes, concerns, or questions. \par

## 2019-09-26 NOTE — HISTORY OF PRESENT ILLNESS
[FreeTextEntry1] : Mr. Knapp is a 73 year old male with a history of allergic rhinitis, SERAFIN, PAH, RAD, and SOB presenting to the office today for a follow up visit. His chief complaint is visual issues.\par -he notes he was able to do [place] 10k without getting cramps\par -he notes he had been using a stair climber in the gym for his training\par -he notes his running pattern is limited to 100 yards, and then walks 100 yards\par -he reports he stays hydrated\par -he reports having a macular pucker, and eye floaters\par -he denies taking any new medications, vitamins, or supplements, except for vitamin D.\par -he notes he occasionally gets odd cardiac numbers, that return to normal \par -he notes he has been having bouts of dizziness or instability\par -he notes he didn’t use an inhaler before his 10K\par -He notes His bowels are regular\par -he notes his weight is stable\par -he notes he gets 4-5 hours of continuous sleep before having an interruption\par -he denies any lump in his throat he has to clear, chest pain, chest pressure, diarrhea, constipation, dysphagia, sour taste in the mouth, reflux

## 2019-09-26 NOTE — PHYSICAL EXAM
[General Appearance - Well Developed] : well developed [Normal Appearance] : normal appearance [General Appearance - Well Nourished] : well nourished [Well Groomed] : well groomed [No Deformities] : no deformities [General Appearance - In No Acute Distress] : no acute distress [Normal Conjunctiva] : the conjunctiva exhibited no abnormalities [Eyelids - No Xanthelasma] : the eyelids demonstrated no xanthelasmas [Normal Oropharynx] : normal oropharynx [Neck Appearance] : the appearance of the neck was normal [Jugular Venous Distention Increased] : there was no jugular-venous distention [Neck Cervical Mass (___cm)] : no neck mass was observed [Thyroid Diffuse Enlargement] : the thyroid was not enlarged [Thyroid Nodule] : there were no palpable thyroid nodules [Heart Sounds] : normal S1 and S2 [Heart Rate And Rhythm] : heart rate and rhythm were normal [Murmurs] : no murmurs present [Auscultation Breath Sounds / Voice Sounds] : lungs were clear to auscultation bilaterally [Respiration, Rhythm And Depth] : normal respiratory rhythm and effort [Exaggerated Use Of Accessory Muscles For Inspiration] : no accessory muscle use [Abdomen Soft] : soft [Abdomen Tenderness] : non-tender [Abnormal Walk] : normal gait [Abdomen Mass (___ Cm)] : no abdominal mass palpated [Gait - Sufficient For Exercise Testing] : the gait was sufficient for exercise testing [Cyanosis, Localized] : no localized cyanosis [Petechial Hemorrhages (___cm)] : no petechial hemorrhages [Nail Clubbing] : no clubbing of the fingernails [Skin Color & Pigmentation] : normal skin color and pigmentation [No Venous Stasis] : no venous stasis [] : no rash [No Skin Ulcers] : no skin ulcer [Skin Lesions] : no skin lesions [Deep Tendon Reflexes (DTR)] : deep tendon reflexes were 2+ and symmetric [No Xanthoma] : no  xanthoma was observed [Sensation] : the sensory exam was normal to light touch and pinprick [No Focal Deficits] : no focal deficits [Oriented To Time, Place, And Person] : oriented to person, place, and time [Impaired Insight] : insight and judgment were intact [Affect] : the affect was normal [III] : III [FreeTextEntry1] : I:E ratio 1:3; clear

## 2019-09-26 NOTE — PROCEDURE
[FreeTextEntry1] : PFT revealed normal flows, with a FEV1 of 3.25L, which is 121% of predicted, with a normal flow volume loop

## 2019-09-26 NOTE — ADDENDUM
[FreeTextEntry1] : Documented by Jay Aguilera acting as a scribe for Dr. Pato Knight on 09/26/2019.\par \par All medical record entries made by the Scribe were at my, Dr. Pato Knight's, direction and personally dictated by me on 09/26/2019. I have reviewed the chart and agree that the record accurately reflects my personal performance of the history, physical exam, assessment and plan. I have also personally directed, reviewed, and agree with the discharge instructions.

## 2020-01-30 ENCOUNTER — NON-APPOINTMENT (OUTPATIENT)
Age: 75
End: 2020-01-30

## 2020-01-30 ENCOUNTER — APPOINTMENT (OUTPATIENT)
Dept: PULMONOLOGY | Facility: CLINIC | Age: 75
End: 2020-01-30
Payer: MEDICARE

## 2020-01-30 VITALS
RESPIRATION RATE: 17 BRPM | BODY MASS INDEX: 24.91 KG/M2 | OXYGEN SATURATION: 98 % | SYSTOLIC BLOOD PRESSURE: 120 MMHG | DIASTOLIC BLOOD PRESSURE: 70 MMHG | WEIGHT: 155 LBS | HEART RATE: 77 BPM | HEIGHT: 66 IN

## 2020-01-30 DIAGNOSIS — K40.90 UNILATERAL INGUINAL HERNIA, W/OUT OBSTRUCTION OR GANGRENE, NOT SPECIFIED AS RECURRENT: ICD-10-CM

## 2020-01-30 PROCEDURE — 99214 OFFICE O/P EST MOD 30 MIN: CPT | Mod: 25

## 2020-01-30 PROCEDURE — 94010 BREATHING CAPACITY TEST: CPT

## 2020-01-30 PROCEDURE — 95012 NITRIC OXIDE EXP GAS DETER: CPT

## 2020-01-30 NOTE — HISTORY OF PRESENT ILLNESS
[FreeTextEntry1] : Mr. Knapp is a 74 year old male with a history of allergic rhinitis, SERAFIN, PAH, RAD, and SOB presenting to the office today for a follow up visit. His chief complaint is hernia\par -he reports he had been stretching, and had felt a bulge occur, and now feels the bulge after eating. This began just before Thanksgiving\par -he reports getting dizziness after standing, and believes it is related to spinal stenosis, and states his legs become numb for a few seconds \par -he reports having had a detached string from his retina 2 years ago, and recently had a macular pucker. He notes his right eye developed a floater about 6 months ago\par -he notes his energy level is 7/10\par -he reports he sleeps about 4-5 hours nightly\par -he states he will sleep longer on weekends, but it doesn’t change his energy or restfulness levels\par -he reports he is exercising by swimming and using the elliptical and bicycling, and notices he is slowing down slightly. He notes he becomes SOB when running compared to swimming or elliptical. He has stopped bicycling due to his hernia\par -he notes he gets a mild post nasal drip in the morning\par -he reports his routine has not changed significantly\par -he notes his HR goes up to 180-190 bpm when exercising\par -he is not using his inhaler before exercising\par -he denies any coughing, wheezing, muscle cramps or spasms, chest pain, chest pressure, diarrhea, constipation, dysphagia, dizziness, sour taste in the mouth, itchy eyes, itchy ears, heartburn, reflux

## 2020-01-30 NOTE — REVIEW OF SYSTEMS
[Negative] : Endocrine [Postnasal Drip] : postnasal drip [SOB on Exertion] : sob on exertion [Cough] : no cough [Wheezing] : no wheezing [Chest Discomfort] : no chest discomfort [Leg Cramps] : no leg cramps [Itchy Eyes] : no itchy eyes [GERD] : no gerd [Diarrhea] : no diarrhea [Constipation] : no constipation [Dysphagia] : no dysphagia [Dizziness] : no dizziness

## 2020-01-30 NOTE — ADDENDUM
[FreeTextEntry1] : Documented by Jay Aguilera acting as a scribe for Dr. Pato Knight on 01/30/2020.\par \par All medical record entries made by the Scribe were at my, Dr. Pato Knight's, direction and personally dictated by me on 01/30/2020. I have reviewed the chart and agree that the record accurately reflects my personal performance of the history, physical exam, assessment and plan. I have also personally directed, reviewed, and agree with the discharge instructions.

## 2020-01-30 NOTE — ASSESSMENT
[FreeTextEntry1] : Mr. Knapp has a h/o AF/flutter, macular pucker, allergy, low vitamin D, RAD, RAH, and SERAFIN who is now mildly compromised from a pulmonary perspective. His number one issue is ?sports hernia.\par \par His progressive SOB is currently stable and is multifactorial due to:\par -exercise\par -PAH\par -RADS\par -?cardiac disease\par -poor mechanics of breathing\par \par problem 1: history of PAH, exercise induced \par -continue Xopenex trial 2 puffs pre exercise, PRN\par -previously failed: Spiriva, Incruse, Stiolto, Revatio, Adcirca, Calcium channel blockers, and Tracleer\par -considering Uptavi (IP Antagonist)\par \par -Disorder of the pulmonary arteries, important to distinguish the difference between pulmonary arterial hypertension which is idiopathic to secondary pulmonary hypertension which is related to heart disease being diastolic dysfunction or congestive heart failure. Diagnostics include an initial echocardiogram evaluating the pulmonary artery pressures, if this is abnormal, to proceed with a VQ scan as well as a CTPA and an eventual right heart catheterization (No medication can be prescribed until the right heart catheterization). If present, the evaluation will include rheumatological blood testing, HIV testing, and potential evaluation for cirrhosis. Drug classes include PED5 (Revatio, Adcirca) ETRA (Tracleer, Macitentan, Letairis), Soluble guanylate cyclase (Adempas) Prostacyclins (Uptravi, Tyavso, Ventavis, Remodulin, or Orenitram derivatives). \par \par problem 2: RADS; \par -s/p Spiriva 1 inhalation QD for 1 week, trial of Incruse 1 inhalation daily for 1 week, trial of Stiolto 2 inhalations daily for 1 week (failed)\par -failed Utibron,Bevespi, Seebri, Spiriva, Incruse, Aiollo \par -continue to use Ventolin PRN and before exercise, PRN\par \par -Inhaler technique reviewed as well as oral hygiene techniques reviewed with patient. Avoidance of cold air, extremes of temperature, rescue inhaler should be used before exercise. Order of medication reviewed with patient. Recommended use of a cool mist humidifier in the bedroom. \par \par problem 3: prior CTA\par -ruled out pulmonary embolism and any underlying disease\par \par problem 4: cardiac component \par -continue to follow up with cardiologist - repeat cardiopulmonary stress test \par -his recent pulmonary cardiac stress test was normal\par \par problem 5: SERAFIN\par -continue to use Oxy-Aid by [facility]\par -recommended to try Boron supplements 6 mg BID\par -Sleep apnea is associated with adverse clinical consequences which an affect most organ systems. Cardiovascular disease risk includes arrhythmias, atrial fibrillation, hypertension, coronary artery disease, and stroke. Metabolic disorders include diabetes type 2, non-alcoholic fatty liver disease. Mood disorder especially depression; and cognitive decline especially in the elderly. Associations with chronic reflux/Marques’s esophagus some but not all inclusive.\par -Reasons include arousal consistent with hypopnea; respiratory events most prominent in REM sleep or supine position; therefore sleep staging and body position are important for accurate diagnosis and estimation of AHI. \par -Treatment options discussed including CPAP/BiPAP machine, oral appliance, ProVent therapy, Oxy-Aid by [facility], new technologies, or positional sleep.Recommended use of the CPAP machine for moderate (AHI >15), moderate to severe (AHI 15-30) and severe patients (AHI > 30). Recommended weight loss which can reduce AHI especially in weight loss of greater than 5% of BMI. Positional sleep is recommended in those with low AHI, low-moderate BMI, and younger age. For severe sleep apnea, the hypoglossal nerve stimulator was recommended as well. \par \par problem 6: exercise\par -recommended to transition from treadmill/swimming/tread water\par -recommended to use natural protein\par \par problem 7: exercise induced pulmonary hypertension\par -he may qualify for prostacyclin drugs including Uptavi or Orenitram \par -considering Uptavi (pending cardiopulmonary exercise stress test)\par \par Problem 8: muscle cramps (improved)\par -continue theraworx \par -continue to use Optimal Electrolytes (pre-exercise)\par -recommended Topricin ointment \par \par problem 9: poor mechanics of breathing\par -Proper breathing techniques were reviewed with an emphasis of exhalation. Patient instructed to breath in for 1 second and out for four seconds. Patient was encouraged to not talk while walking. \par \par -recommended to hydrate before exercise\par -recommended to use a heart rate monitor while exercising\par -recommended to use a natural protein powder \par \par problem 10: health maintenance \par -Recommended to see Dr. SCAR Nichols for his sports hernia\par -Recommended to take Turmeric\par -Recommended to take Lobelia/Iobelia\par -recommended to try optimal electrolytes \par -recommended yearly flu shot after October 15, refused \par -recommended strep pneumonia vaccines: Prevnar-13 vaccine, followed by Pneumo vaccine 23 one year following (completed)\par -recommended early intervention for URIs\par -recommended regular osteoporosis evaluations\par -recommended early dermatological evaluations\par -recommended after the age of 50 to the age of 70, colonoscopy every 5 years \par - recommend Vitamin D supplements \par - recommend probiotic\par  \par F/U in 4 months \par He is encouraged to call with any changes, concerns, or questions. \par

## 2020-01-30 NOTE — PHYSICAL EXAM
[Normal Appearance] : normal appearance [Well Groomed] : well groomed [General Appearance - Well Nourished] : well nourished [General Appearance - Well Developed] : well developed [Normal Conjunctiva] : the conjunctiva exhibited no abnormalities [General Appearance - In No Acute Distress] : no acute distress [No Deformities] : no deformities [Eyelids - No Xanthelasma] : the eyelids demonstrated no xanthelasmas [Normal Oropharynx] : normal oropharynx [Jugular Venous Distention Increased] : there was no jugular-venous distention [Neck Cervical Mass (___cm)] : no neck mass was observed [Neck Appearance] : the appearance of the neck was normal [Thyroid Diffuse Enlargement] : the thyroid was not enlarged [Heart Rate And Rhythm] : heart rate and rhythm were normal [Thyroid Nodule] : there were no palpable thyroid nodules [Heart Sounds] : normal S1 and S2 [Murmurs] : no murmurs present [Auscultation Breath Sounds / Voice Sounds] : lungs were clear to auscultation bilaterally [Respiration, Rhythm And Depth] : normal respiratory rhythm and effort [Exaggerated Use Of Accessory Muscles For Inspiration] : no accessory muscle use [Abdomen Soft] : soft [Abdomen Tenderness] : non-tender [Abnormal Walk] : normal gait [Abdomen Mass (___ Cm)] : no abdominal mass palpated [Gait - Sufficient For Exercise Testing] : the gait was sufficient for exercise testing [Petechial Hemorrhages (___cm)] : no petechial hemorrhages [Nail Clubbing] : no clubbing of the fingernails [Cyanosis, Localized] : no localized cyanosis [Skin Color & Pigmentation] : normal skin color and pigmentation [No Venous Stasis] : no venous stasis [] : no rash [No Skin Ulcers] : no skin ulcer [No Xanthoma] : no  xanthoma was observed [Skin Lesions] : no skin lesions [Deep Tendon Reflexes (DTR)] : deep tendon reflexes were 2+ and symmetric [No Focal Deficits] : no focal deficits [Sensation] : the sensory exam was normal to light touch and pinprick [Impaired Insight] : insight and judgment were intact [Oriented To Time, Place, And Person] : oriented to person, place, and time [Affect] : the affect was normal [I] : I [FreeTextEntry1] : ?hernia in RLQ

## 2020-01-30 NOTE — PROCEDURE
[FreeTextEntry1] : PFT revealed normal flows, with a FEV1 of 3.45L, which is 130% of predicted, with a normal flow volume loop \par \par FENO was 27; a normal value being less than 25\par Fractional exhaled nitric oxide (FENO) is regarded as a simple, noninvasive method for assessing eosinophilic airway inflammation. Produced by a variety of cells within the lung, nitric oxide (NO) concentrations are generally low in healthy individuals. However, high concentrations of NO appear to be involved in nonspecific host defense mechanisms and chronic inflammatory diseases such as asthma. The American Thoracic Society (ATS) therefore has recommended using FENO to aid in the diagnosis and monitoring of eosinophilic airway inflammation and asthma, and for identifying steroid responsive individuals whose chronic respiratory symptoms may be caused by airway inflammation.

## 2020-03-04 ENCOUNTER — APPOINTMENT (OUTPATIENT)
Dept: SURGERY | Facility: CLINIC | Age: 75
End: 2020-03-04
Payer: MEDICARE

## 2020-03-04 VITALS
HEIGHT: 68 IN | TEMPERATURE: 98.5 F | HEART RATE: 86 BPM | BODY MASS INDEX: 23.49 KG/M2 | OXYGEN SATURATION: 100 % | DIASTOLIC BLOOD PRESSURE: 92 MMHG | WEIGHT: 155 LBS | RESPIRATION RATE: 16 BRPM | SYSTOLIC BLOOD PRESSURE: 139 MMHG

## 2020-03-04 VITALS
RESPIRATION RATE: 16 BRPM | SYSTOLIC BLOOD PRESSURE: 139 MMHG | OXYGEN SATURATION: 100 % | BODY MASS INDEX: 23.49 KG/M2 | HEIGHT: 68 IN | HEART RATE: 86 BPM | TEMPERATURE: 98.5 F | WEIGHT: 155 LBS | DIASTOLIC BLOOD PRESSURE: 92 MMHG

## 2020-03-04 PROCEDURE — 99205 OFFICE O/P NEW HI 60 MIN: CPT

## 2020-03-04 RX ORDER — LEVALBUTEROL TARTRATE 45 UG/1
45 AEROSOL, METERED ORAL
Qty: 1 | Refills: 1 | Status: DISCONTINUED | COMMUNITY
Start: 2018-10-17 | End: 2020-03-04

## 2020-03-04 NOTE — CONSULT LETTER
[Dear  ___] : Dear  [unfilled], [Sincerely,] : Sincerely, [FreeTextEntry2] : Dr. Evaristo Wise [FreeTextEntry1] : At your recommendation I saw Chandler Knapp in the office on March 4th for evaluation of a groin hernia. He stated that in November of last year he first noted a bulge in the right groin which has progressed minimally in size since then. He has noted occasional, very mild discomfort but he denied generalized abdominal symptoms or change in bowel habits. He stated that the bulge reduces with recumbency but he has, at times, noted bowel sounds within the bulge.\par \par On exam, the abdomen was soft, nondistended and nontender without palpable mass or organomegaly. Examination of the left groin was unremarkable but on the right, a moderate sized, nontender, reducible, bowel containing inguinal hernia was apparent. The remainder of the exam was noncontributory.\par \par I discussed the situation with Mr. Knapp and advised elective repair of his right inguinal hernia in the ambulatory OR. Should eventually come to surgery I will update you on his status and thanks very much for allowing me to participate in this pleasant gentleman's care. [FreeTextEntry3] : \par \par Sanjeev Nichols M.D., F.A.C.S. [DrLashell  ___] : Dr. WALL

## 2020-03-04 NOTE — PHYSICAL EXAM
[Normal Breath Sounds] : Normal breath sounds [Normal Thyroid] : the thyroid was normal [Normal Heart Sounds] : normal heart sounds [No Rash or Lesion] : No rash or lesion [Alert] : alert [Oriented to Person] : oriented to person [Oriented to Time] : oriented to time [Oriented to Place] : oriented to place [Calm] : calm [JVD] : no jugular venous distention  [de-identified] : Appears well nourished, in no acute distress. [de-identified] : WNL [de-identified] : Soft, nondistended, nontender. No palpable mass or organomegaly. Left groin- no palpable hernia. Right groin-   moderate sized, nontender, reducible inguinal hernia.   [de-identified] : Abnormal rhythm [de-identified] : Full ROM

## 2020-03-04 NOTE — HISTORY OF PRESENT ILLNESS
[de-identified] : Chandler is a 74 year old male with a history of AF/Flutter. S/P cardiac ablation in 2015. Not currently on AC. \par The patient presents to the office today with complaints of intermittent bulging/discomfort in the right inguinal area for the past 3-4 months. The patient feels bulging after eating, spontaneously reduces. [de-identified] : Patient first noted a bulge in right groin about 4 months ago. Minimal enlargement since then. Occasional mild local discomfort. No abdominal symptoms or change in bowel habits. Bulge reduces with recumbency. Family history significant for father with a history of hernia.

## 2020-03-04 NOTE — ASSESSMENT
[FreeTextEntry1] : 74-year-old male with a history of pulmonary fibrosis and atrial fibrillation now with bowel containing right inguinal hernia.

## 2020-06-02 ENCOUNTER — APPOINTMENT (OUTPATIENT)
Dept: PULMONOLOGY | Facility: CLINIC | Age: 75
End: 2020-06-02
Payer: MEDICARE

## 2020-06-02 VITALS
SYSTOLIC BLOOD PRESSURE: 120 MMHG | WEIGHT: 155 LBS | HEIGHT: 68 IN | HEART RATE: 84 BPM | DIASTOLIC BLOOD PRESSURE: 70 MMHG | TEMPERATURE: 98.2 F | OXYGEN SATURATION: 99 % | BODY MASS INDEX: 23.49 KG/M2 | RESPIRATION RATE: 17 BRPM

## 2020-06-02 PROCEDURE — 99214 OFFICE O/P EST MOD 30 MIN: CPT

## 2020-06-02 NOTE — ASSESSMENT
Kailyn Lazar is a 62year old female. Patient presents with:  Abdominal Pain: patient c/o dirrhea, constipation  Hip Pain: left hip  Knee Pain: left knee      HPI:     HPI  Patient is here with multiple complaints. She is new to me.   Her PCP is Dr. Hank Grajeda She has history of LGSIL-in February 2015, she had a repeat Pap smear in December 2015 which was negative with cotesting. Repeat Pap smear was recommended in 5 years per gynecology. Vaginal bleeding, irritation, discharge or any other symptoms.  Denies  a [FreeTextEntry1] : Mr. Knapp is a 74 year old male who has a h/o AF/flutter, macular pucker, allergy, low vitamin D, RAD, RAH, and SERAFIN who is now mildly compromised from a pulmonary perspective. His number one issue is cecal issues / knee issues \par \par His progressive SOB is currently stable and is multifactorial due to:\par -exercise\par -PAH\par -RADS\par -?cardiac disease\par -poor mechanics of breathing\par \par problem 1: history of PAH, exercise induced \par -previously failed:, Revatio, Adcirca, Calcium channel blockers, and Tracleer\par -considering Uptavi (IP Antagonist)\par \par -Disorder of the pulmonary arteries, important to distinguish the difference between pulmonary arterial hypertension which is idiopathic to secondary pulmonary hypertension which is related to heart disease being diastolic dysfunction or congestive heart failure. Diagnostics include an initial echocardiogram evaluating the pulmonary artery pressures, if this is abnormal, to proceed with a VQ scan as well as a CTPA and an eventual right heart catheterization (No medication can be prescribed until the right heart catheterization). If present, the evaluation will include rheumatological blood testing, HIV testing, and potential evaluation for cirrhosis. Drug classes include PED5 (Revatio, Adcirca) ETRA (Tracleer, Macitentan, Letairis), Soluble guanylate cyclase (Adempas) Prostacyclins (Uptravi, Tyavso, Ventavis, Remodulin, or Orenitram derivatives). \par \par problem 2: RADS; \par -s/p Spiriva 1 inhalation QD for 1 week, trial of Incruse 1 inhalation daily for 1 week, trial of Stiolto 2 inhalations daily for 1 week (failed)\par -failed Utibron,Bevespi, Seebri, Spiriva, Incruse, Anoro\par -continue to use Ventolin or Xopenex  PRN and before exercise, PRN\par \par -Inhaler technique reviewed as well as oral hygiene techniques reviewed with patient. Avoidance of cold air, extremes of temperature, rescue inhaler should be used before exercise. Order of medication reviewed with patient. Recommended use of a cool mist humidifier in the bedroom. \par \par Problem 2A: Allergies\par -continue Claritin 10 mg QD\par Environmental measures for allergies were encouraged including mattress and pillow cover, air purifier, and environmental controls.\par \par problem 3: prior CTA\par -ruled out pulmonary embolism and any underlying disease\par \par problem 4: cardiac component \par -continue to follow up with cardiologist - repeat cardiopulmonary stress test \par -his recent pulmonary cardiac stress test was normal\par \par problem 5: SERAFIN\par -continue to use Oxy-Aid by [employer]pite[employer] / "Sleep Rite"\par -recommended to try Boron supplements 6 mg BID\par -Sleep apnea is associated with adverse clinical consequences which an affect most organ systems. Cardiovascular disease risk includes arrhythmias, atrial fibrillation, hypertension, coronary artery disease, and stroke. Metabolic disorders include diabetes type 2, non-alcoholic fatty liver disease. Mood disorder especially depression; and cognitive decline especially in the elderly. Associations with chronic reflux/Marques’s esophagus some but not all inclusive.\par -Reasons include arousal consistent with hypopnea; respiratory events most prominent in REM sleep or supine position; therefore sleep staging and body position are important for accurate diagnosis and estimation of AHI. \par -Treatment options discussed including CPAP/BiPAP machine, oral appliance, ProVent therapy, Oxy-Aid by [employer], new technologies, or positional sleep.Recommended use of the CPAP machine for moderate (AHI >15), moderate to severe (AHI 15-30) and severe patients (AHI > 30). Recommended weight loss which can reduce AHI especially in weight loss of greater than 5% of BMI. Positional sleep is recommended in those with low AHI, low-moderate BMI, and younger age. For severe sleep apnea, the hypoglossal nerve stimulator was recommended as well. \par \par problem 6: exercise\par -recommended to transition from treadmill/swimming/tread water\par -recommended to use natural protein\par \par problem 7: exercise induced pulmonary hypertension\par -he may qualify for prostacyclin drugs including Uptavi or Orenitram \par -considering Uptavi (pending cardiopulmonary exercise stress test)\par \par Problem 8: muscle cramps (improved)\par -continue theraworx \par -continue to use Optimal Electrolytes (pre-exercise)\par -recommended Topricin ointment \par \par problem 9: poor mechanics of breathing\par -Proper breathing techniques were reviewed with an emphasis of exhalation. Patient instructed to breath in for 1 second and out for four seconds. Patient was encouraged to not talk while walking. \par \par -recommended to hydrate before exercise\par -recommended to use a heart rate monitor while exercising\par -recommended to use a natural protein powder \par \par Problem 10: Health Maintenance/COVID19 Precautions:\par - Clean your hands often. Wash your hands often with soap and water for at least 20 seconds, especially after blowing your nose, coughing, or sneezing, or having been in a public place.\par - If soap and water are not available, use a hand  that contains at least 60% alcohol.\par - To the extent possible, avoid touching high-touch surfaces in public places - elevator buttons, door handles, handrails, handshaking with people, etc. Use a tissue or your sleeve to cover your hand or finger if you must touch something.\par - Wash your hands after touching surfaces in public places.\par - Avoid touching your face, nose, eyes, etc.\par - Clean and disinfect your home to remove germs: practice routine cleaning of frequently touched surfaces (for example: tables, doorknobs, light switches, handles, desks, toilets, faucets, sinks & cell phones)\par - Avoid crowds, especially in poorly ventilated spaces. Your risk of exposure to respiratory viruses like COVID-19 may increase in crowded, closed-in settings with little air circulation if there are people in the crowd who are sick. All patients are recommended to practice social distancing and stay at least 6 feet away from others.\par - Avoid all non-essential travel including plane trips, and especially avoid embarking on cruise ships.\par -If COVID-19 is spreading in your community, take extra measures to put distance between yourself and other people to further reduce your risk of being exposed to this new virus.\par -Stay home as much as possible.\par - Consider ways of getting food brought to your house through family, social, or commercial networks\par -Be aware that the virus has been known to live in the air up to 3 hours post exposure, cardboard up to 24 hours post exposure, copper up to 4 hours post exposure, steel and plastic up to 2-3 days post exposure. Risk of transmission from these surfaces are affected by many variables.\par Immune Support Recommendations:\par -OTC Vitamin C 500mg BID \par -OTC Quercetin 250-500mg BID \par -OTC Zinc 75-100mg per day \par -OTC Melatonin 1 or 2 mg a night \par -OTC Vitamin D 1-4000mg per day \par -OTC Tonic Water 8oz per day\par Asthma and COVID19:\par You need to make sure your asthma is under control. This often requires the use of inhaled corticosteroids (and sometimes oral corticosteroids). Inhaled corticosteroids do not likely reduce your immune system’s ability to fight infections, but oral corticosteroids may. It is important to use the steps above to protect yourself to limit your exposure to any respiratory virus.\par \par problem 11: health maintenance \par -Recommended to take Turmeric\par -Recommended to take Lobelia/Iobelia\par -recommended to try optimal electrolytes \par -recommended yearly flu shot after October 15, refused \par -recommended strep pneumonia vaccines: Prevnar-13 vaccine, followed by Pneumo vaccine 23 one year following (completed)\par -recommended early intervention for URIs\par -recommended regular osteoporosis evaluations\par -recommended early dermatological evaluations\par -recommended after the age of 50 to the age of 70, colonoscopy every 5 years \par - recommend Vitamin D supplements \par - recommend probiotic\par  \par F/U in 4 months \par He is encouraged to call with any changes, concerns, or questions. \par  Musculoskeletal: Positive for joint pain. Negative for back pain and neck pain. Left knee and left hip pain   Skin: Negative for itching and rash. Neurological: Negative for dizziness and headaches.    Endo/Heme/Allergies: Negative for environmenta -     XR KNEE, COMPLETE (4 OR MORE VIEWS), LEFT (JPQ=06086); Future  Refractory to over-the-counter NSAIDs. Avoiding NSAIDs due to possible PUD given her symptoms. We will get baseline x-ray of the left knee. Has  patellar laxity.   Referring to orthoped Side effects of chronic use of benzodiazepines discussed. Inform the patient that lorazepam is only for acute anxiety, not for daily use. 30 tablets for 6-month supply. Alcohol abuse daily. 2 drinks of huseyin per day.   Counseled on alcohol abstinence

## 2020-06-02 NOTE — PHYSICAL EXAM
[General Appearance - Well Developed] : well developed [Normal Appearance] : normal appearance [Well Groomed] : well groomed [General Appearance - Well Nourished] : well nourished [No Deformities] : no deformities [General Appearance - In No Acute Distress] : no acute distress [Normal Conjunctiva] : the conjunctiva exhibited no abnormalities [Eyelids - No Xanthelasma] : the eyelids demonstrated no xanthelasmas [Normal Oropharynx] : normal oropharynx [II] : II [Neck Appearance] : the appearance of the neck was normal [Neck Cervical Mass (___cm)] : no neck mass was observed [Jugular Venous Distention Increased] : there was no jugular-venous distention [Thyroid Diffuse Enlargement] : the thyroid was not enlarged [Thyroid Nodule] : there were no palpable thyroid nodules [Heart Rate And Rhythm] : heart rate and rhythm were normal [Heart Sounds] : normal S1 and S2 [Murmurs] : no murmurs present [Respiration, Rhythm And Depth] : normal respiratory rhythm and effort [Exaggerated Use Of Accessory Muscles For Inspiration] : no accessory muscle use [Auscultation Breath Sounds / Voice Sounds] : lungs were clear to auscultation bilaterally [Abdomen Soft] : soft [Abdomen Tenderness] : non-tender [Abdomen Mass (___ Cm)] : no abdominal mass palpated [Abnormal Walk] : normal gait [Gait - Sufficient For Exercise Testing] : the gait was sufficient for exercise testing [Nail Clubbing] : no clubbing of the fingernails [Cyanosis, Localized] : no localized cyanosis [Petechial Hemorrhages (___cm)] : no petechial hemorrhages [Skin Color & Pigmentation] : normal skin color and pigmentation [] : no rash [No Venous Stasis] : no venous stasis [Skin Lesions] : no skin lesions [No Skin Ulcers] : no skin ulcer [No Xanthoma] : no  xanthoma was observed [Deep Tendon Reflexes (DTR)] : deep tendon reflexes were 2+ and symmetric [Sensation] : the sensory exam was normal to light touch and pinprick [No Focal Deficits] : no focal deficits [Oriented To Time, Place, And Person] : oriented to person, place, and time [Impaired Insight] : insight and judgment were intact [Affect] : the affect was normal [FreeTextEntry1] : ?hernia in RLQ

## 2020-06-02 NOTE — HISTORY OF PRESENT ILLNESS
[FreeTextEntry1] : Mr. Knapp is a 74 year old male with a history of allergic rhinitis, SERAFIN, PAH, RAD, and SOB presenting to the office today for a follow up visit. His chief complaint is secal / knee issues \par - he has not been exercising as much. He has been having trouble biking, he has been walking. He has been able to run for a few miles but then his knee causes him discomfort. \par - He has seen a Dr. for his hernia. \par - he has been using a little bit of weight. He's about 150-155 lbs. \par - he has been noticing a nasal drip due to allergies. \par - his energy level has been pretty good. \par - he has been going to the bathroom regularly\par - he has not been taking any inhaler or anything at all before exercise to help him. \par - he has Claritin at home but he has not been using anything for his sinuses. He keeps Claritin handy just in case. \par - his sleep pattern has not been consistent lately. He gets up quite early. \par -he denies any headaches, nausea, vomiting, fever, chills, sweats, chest pain, chest pressure, diarrhea, constipation, dysphagia, dizziness, sour taste in the mouth, leg swelling, leg pain, itchy eyes, itchy ears, heartburn, reflux, myalgias or arthralgias.

## 2020-06-02 NOTE — ADDENDUM
[FreeTextEntry1] : Documented by Ashley Toro acting as a scribe for Dr. Pato Knight on 06/02/2020.\par \par All medical record entries made by the Scribe were at my, Dr. Pato Knight's, direction and personally dictated by me on 06/02/2020. I have reviewed the chart and agree that the record accurately reflects my personal performance of the history, physical exam, assessment and plan. I have also personally directed, reviewed, and agree with the discharge instructions.

## 2020-06-05 ENCOUNTER — APPOINTMENT (OUTPATIENT)
Dept: PULMONOLOGY | Facility: CLINIC | Age: 75
End: 2020-06-05

## 2020-06-12 ENCOUNTER — APPOINTMENT (OUTPATIENT)
Dept: CT IMAGING | Facility: IMAGING CENTER | Age: 75
End: 2020-06-12
Payer: MEDICARE

## 2020-06-12 ENCOUNTER — OUTPATIENT (OUTPATIENT)
Dept: OUTPATIENT SERVICES | Facility: HOSPITAL | Age: 75
LOS: 1 days | End: 2020-06-12
Payer: MEDICARE

## 2020-06-12 ENCOUNTER — RESULT REVIEW (OUTPATIENT)
Age: 75
End: 2020-06-12

## 2020-06-12 DIAGNOSIS — Z00.00 ENCOUNTER FOR GENERAL ADULT MEDICAL EXAMINATION WITHOUT ABNORMAL FINDINGS: ICD-10-CM

## 2020-06-12 PROCEDURE — 74177 CT ABD & PELVIS W/CONTRAST: CPT

## 2020-06-12 PROCEDURE — 82565 ASSAY OF CREATININE: CPT

## 2020-06-12 PROCEDURE — 74177 CT ABD & PELVIS W/CONTRAST: CPT | Mod: 26

## 2020-07-22 DIAGNOSIS — Z01.818 ENCOUNTER FOR OTHER PREPROCEDURAL EXAMINATION: ICD-10-CM

## 2020-07-25 ENCOUNTER — APPOINTMENT (OUTPATIENT)
Dept: DISASTER EMERGENCY | Facility: CLINIC | Age: 75
End: 2020-07-25

## 2020-07-26 LAB — SARS-COV-2 N GENE NPH QL NAA+PROBE: NOT DETECTED

## 2020-07-27 ENCOUNTER — TRANSCRIPTION ENCOUNTER (OUTPATIENT)
Age: 75
End: 2020-07-27

## 2020-07-27 VITALS
TEMPERATURE: 97 F | HEIGHT: 68 IN | RESPIRATION RATE: 20 BRPM | SYSTOLIC BLOOD PRESSURE: 116 MMHG | WEIGHT: 152.12 LBS | OXYGEN SATURATION: 100 % | HEART RATE: 75 BPM | DIASTOLIC BLOOD PRESSURE: 77 MMHG

## 2020-07-27 RX ORDER — CEFAZOLIN SODIUM 1 G
2000 VIAL (EA) INJECTION ONCE
Refills: 0 | Status: DISCONTINUED | OUTPATIENT
Start: 2020-07-28 | End: 2020-08-12

## 2020-07-27 RX ORDER — SODIUM CHLORIDE 9 MG/ML
3 INJECTION INTRAMUSCULAR; INTRAVENOUS; SUBCUTANEOUS EVERY 8 HOURS
Refills: 0 | Status: DISCONTINUED | OUTPATIENT
Start: 2020-07-28 | End: 2020-08-12

## 2020-07-27 RX ORDER — LIDOCAINE HCL 20 MG/ML
0.2 VIAL (ML) INJECTION ONCE
Refills: 0 | Status: DISCONTINUED | OUTPATIENT
Start: 2020-07-28 | End: 2020-08-12

## 2020-07-27 RX ORDER — CHLORHEXIDINE GLUCONATE 213 G/1000ML
1 SOLUTION TOPICAL ONCE
Refills: 0 | Status: DISCONTINUED | OUTPATIENT
Start: 2020-07-28 | End: 2020-08-12

## 2020-07-27 NOTE — H&P PST ADULT - PRIMARY CARE PROVIDER
Gregorio Last [phone]; Pato Knight, [facility], [phone] Gregorio Last [phone]; Pato Knight, pulm, [phone];  Dr Angel, cardio, [phone]

## 2020-07-27 NOTE — H&P PST ADULT - HISTORY OF PRESENT ILLNESS
Telephone interview for history, physical to be done DOS    Mr. Knapp is a 74 year old man with PMH ASHD, Afib s/p ablation and cardioversion not on medication, PSVT in the past, pulmonary fibrosis and pulmonary hypertension who has a right inguinal hernia scheduled for hernia repair 7/28/2020.  Last seen by Dr. Knight, pulm, 6/2020 and medical evaluation done by Dr. Wise 7/23/2020.    COVID done 7/25/2020, resulted negative.

## 2020-07-27 NOTE — H&P PST ADULT - NSICDXPASTMEDICALHX_GEN_ALL_CORE_FT
PAST MEDICAL HISTORY:  A-fib ablation 2011, cardioversion 2015    H/O pulmonary fibrosis     H/O pulmonary hypertension     Tachycardia PAST MEDICAL HISTORY:  A-fib ablation 2011, cardioversion 2015    H/O orthostatic hypotension long time ago    H/O pulmonary fibrosis     H/O pulmonary hypertension     Tachycardia

## 2020-07-27 NOTE — H&P PST ADULT - NSICDXPROBLEM_GEN_ALL_CORE_FT
PROBLEM DIAGNOSES  Problem: Unilateral inguinal hernia without obstruction or gangrene  Assessment and Plan: Scheduled for Right inguinal hernia repair.  Chlorhexidine to affected area.  Preop instructions given.  Medical eval and labs in chart.    Problem: Pulmonary fibrosis  Assessment and Plan: Seen by Dr. Knight in June 2020  Medical eval in chart    Problem: Pulmonary hypertension  Assessment and Plan: Seen by Dr. Knight in June 2020  Medical eval in chart

## 2020-07-28 ENCOUNTER — APPOINTMENT (OUTPATIENT)
Dept: SURGERY | Facility: HOSPITAL | Age: 75
End: 2020-07-28
Payer: MEDICARE

## 2020-07-28 ENCOUNTER — RESULT REVIEW (OUTPATIENT)
Age: 75
End: 2020-07-28

## 2020-07-28 ENCOUNTER — OUTPATIENT (OUTPATIENT)
Dept: OUTPATIENT SERVICES | Facility: HOSPITAL | Age: 75
LOS: 1 days | End: 2020-07-28
Payer: MEDICARE

## 2020-07-28 VITALS
SYSTOLIC BLOOD PRESSURE: 135 MMHG | DIASTOLIC BLOOD PRESSURE: 80 MMHG | RESPIRATION RATE: 16 BRPM | TEMPERATURE: 98 F | HEART RATE: 82 BPM | OXYGEN SATURATION: 99 %

## 2020-07-28 DIAGNOSIS — J84.10 PULMONARY FIBROSIS, UNSPECIFIED: ICD-10-CM

## 2020-07-28 DIAGNOSIS — I27.20 PULMONARY HYPERTENSION, UNSPECIFIED: ICD-10-CM

## 2020-07-28 DIAGNOSIS — K40.90 UNILATERAL INGUINAL HERNIA, WITHOUT OBSTRUCTION OR GANGRENE, NOT SPECIFIED AS RECURRENT: ICD-10-CM

## 2020-07-28 PROCEDURE — 88304 TISSUE EXAM BY PATHOLOGIST: CPT | Mod: 26

## 2020-07-28 PROCEDURE — 55520 REMOVAL OF SPERM CORD LESION: CPT | Mod: 59,RT

## 2020-07-28 PROCEDURE — C1781: CPT

## 2020-07-28 PROCEDURE — 88304 TISSUE EXAM BY PATHOLOGIST: CPT

## 2020-07-28 PROCEDURE — 49505 PRP I/HERN INIT REDUC >5 YR: CPT | Mod: RT

## 2020-07-28 RX ORDER — ONDANSETRON 8 MG/1
4 TABLET, FILM COATED ORAL ONCE
Refills: 0 | Status: DISCONTINUED | OUTPATIENT
Start: 2020-07-28 | End: 2020-08-12

## 2020-07-28 RX ORDER — HYDROMORPHONE HYDROCHLORIDE 2 MG/ML
0.25 INJECTION INTRAMUSCULAR; INTRAVENOUS; SUBCUTANEOUS
Refills: 0 | Status: DISCONTINUED | OUTPATIENT
Start: 2020-07-28 | End: 2020-07-28

## 2020-07-28 RX ORDER — SODIUM CHLORIDE 9 MG/ML
1000 INJECTION, SOLUTION INTRAVENOUS
Refills: 0 | Status: DISCONTINUED | OUTPATIENT
Start: 2020-07-28 | End: 2020-08-12

## 2020-07-28 NOTE — BRIEF OPERATIVE NOTE - OPERATION/FINDINGS
Femoral hernia identified. Mesh placed over inguinal region and tied to coopers limit. Femoral hernia identified. Hernia reduced, sac removed and defect closed. Mesh placed over inguinal region and anchored to coopers ligament.

## 2020-07-28 NOTE — ASU DISCHARGE PLAN (ADULT/PEDIATRIC) - CALL YOUR DOCTOR IF YOU HAVE ANY OF THE FOLLOWING:
Swelling that gets worse/Numbness, tingling, color or temperature change to extremity/Unable to urinate/Fever greater than (need to indicate Fahrenheit or Celsius)/Nausea and vomiting that does not stop/Increased irritability or sluggishness/Wound/Surgical Site with redness, or foul smelling discharge or pus/Pain not relieved by Medications/Bleeding that does not stop

## 2020-07-28 NOTE — ASU DISCHARGE PLAN (ADULT/PEDIATRIC) - CARE PROVIDER_API CALL
Sanjeev Nichols J  [facility]  [address] [place] [address]  Phone: [phone]  Fax: [phone]  Follow Up Time: 2 weeks

## 2020-07-28 NOTE — ASU DISCHARGE PLAN (ADULT/PEDIATRIC) - NURSING INSTRUCTIONS
******************************************************************************************   Next dose of TYLENOL may be taken at or after 6:10 PM if needed. DO NOT take any products containing TYLENOL or ACETAMINOPHEN, such as VICODIN, PERCOCET, NORCO, EXCEDRIN, and any over-the-counter cold medications. DO NOT CONSUME MORE THAN [id number] MG OF TYLENOL (acetaminophen) in a 24-hour period.  ******************************************************************************************

## 2020-07-28 NOTE — ASU DISCHARGE PLAN (ADULT/PEDIATRIC) - FOLLOW UP APPOINTMENTS
[facility] ([facility]): [phone] or go to the nearest Emergency Room

## 2020-07-28 NOTE — ASU DISCHARGE PLAN (ADULT/PEDIATRIC) - MEDICATION INSTRUCTIONS
Take Tylenol and Advil for pain control every 3 hours. Take prescription called to your pharmacy for breakthrough pain

## 2020-07-29 PROBLEM — Z86.79 PERSONAL HISTORY OF OTHER DISEASES OF THE CIRCULATORY SYSTEM: Chronic | Status: ACTIVE | Noted: 2020-07-28

## 2020-07-30 DIAGNOSIS — Z09 ENCOUNTER FOR FOLLOW-UP EXAMINATION AFTER COMPLETED TREATMENT FOR CONDITIONS OTHER THAN MALIGNANT NEOPLASM: ICD-10-CM

## 2020-07-31 ENCOUNTER — APPOINTMENT (OUTPATIENT)
Dept: SURGERY | Facility: CLINIC | Age: 75
End: 2020-07-31
Payer: MEDICARE

## 2020-07-31 VITALS
HEART RATE: 100 BPM | HEIGHT: 68 IN | DIASTOLIC BLOOD PRESSURE: 92 MMHG | SYSTOLIC BLOOD PRESSURE: 138 MMHG | WEIGHT: 156 LBS | TEMPERATURE: 98.3 F | RESPIRATION RATE: 18 BRPM | BODY MASS INDEX: 23.64 KG/M2 | OXYGEN SATURATION: 95 %

## 2020-07-31 DIAGNOSIS — K40.90 UNILATERAL INGUINAL HERNIA, W/OUT OBSTRUCTION OR GANGRENE, NOT SPECIFIED AS RECURRENT: ICD-10-CM

## 2020-07-31 PROCEDURE — 99024 POSTOP FOLLOW-UP VISIT: CPT

## 2020-08-03 LAB — SURGICAL PATHOLOGY STUDY: SIGNIFICANT CHANGE UP

## 2020-10-06 ENCOUNTER — APPOINTMENT (OUTPATIENT)
Dept: PULMONOLOGY | Facility: CLINIC | Age: 75
End: 2020-10-06
Payer: MEDICARE

## 2020-10-06 VITALS
WEIGHT: 155 LBS | OXYGEN SATURATION: 98 % | SYSTOLIC BLOOD PRESSURE: 130 MMHG | RESPIRATION RATE: 17 BRPM | DIASTOLIC BLOOD PRESSURE: 70 MMHG | HEART RATE: 87 BPM | TEMPERATURE: 97.3 F | BODY MASS INDEX: 23.49 KG/M2 | HEIGHT: 68 IN

## 2020-10-06 DIAGNOSIS — Z01.812 ENCOUNTER FOR PREPROCEDURAL LABORATORY EXAMINATION: ICD-10-CM

## 2020-10-06 PROCEDURE — 99214 OFFICE O/P EST MOD 30 MIN: CPT | Mod: 25

## 2020-10-06 PROCEDURE — 95012 NITRIC OXIDE EXP GAS DETER: CPT

## 2020-10-06 PROCEDURE — 94618 PULMONARY STRESS TESTING: CPT

## 2020-10-06 NOTE — ASSESSMENT
[FreeTextEntry1] : Mr. Knapp is a 74 year old male who has a h/o AF/flutter, macular pucker, allergy, low vitamin D, RAD, RAH, and SERAFIN who is now mildly compromised from a pulmonary perspective. His number one issue is right inguinal hernia issues. \par \par His progressive SOB is currently stable and is multifactorial due to:\par -exercise\par -PAH\par -RADS\par -?cardiac disease\par -poor mechanics of breathing\par \par problem 1: history of PAH, exercise induced \par -previously failed:, Revatio, Adcirca, Calcium channel blockers, and Tracleer\par -considering Uptavi (IP Antagonist)\par \par -Disorder of the pulmonary arteries, important to distinguish the difference between pulmonary arterial hypertension which is idiopathic to secondary pulmonary hypertension which is related to heart disease being diastolic dysfunction or congestive heart failure. Diagnostics include an initial echocardiogram evaluating the pulmonary artery pressures, if this is abnormal, to proceed with a VQ scan as well as a CTPA and an eventual right heart catheterization (No medication can be prescribed until the right heart catheterization). If present, the evaluation will include rheumatological blood testing, HIV testing, and potential evaluation for cirrhosis. Drug classes include PED5 (Revatio, Adcirca) ETRA (Tracleer, Macitentan, Letairis), Soluble guanylate cyclase (Adempas) Prostacyclins (Uptravi, Tyavso, Ventavis, Remodulin, or Orenitram derivatives). \par \par problem 2: RADS; \par - trial of new triple Ex MDI\par -failed Utibron,Bevespi, Seebri, Spiriva, Incruse, Anoro, Duaklir\par -continue to use Ventolin or Xopenex  PRN and before exercise, PRN\par \par -Inhaler technique reviewed as well as oral hygiene techniques reviewed with patient. Avoidance of cold air, extremes of temperature, rescue inhaler should be used before exercise. Order of medication reviewed with patient. Recommended use of a cool mist humidifier in the bedroom. \par \par Problem 2A: Allergies\par -continue Claritin 10 mg QD\par Environmental measures for allergies were encouraged including mattress and pillow cover, air purifier, and environmental controls.\par \par problem 3: prior CTA\par -ruled out pulmonary embolism and any underlying disease\par \par problem 4: cardiac component \par -continue to follow up with cardiologist - repeat cardiopulmonary stress test \par -his recent pulmonary cardiac stress test was normal\par \par problem 5: SERAFIN\par -continue to use Oxy-Aid by [employer] / "Sleep Rite"\par -recommended to try Boron supplements 6 mg BID\par -Sleep apnea is associated with adverse clinical consequences which an affect most organ systems. Cardiovascular disease risk includes arrhythmias, atrial fibrillation, hypertension, coronary artery disease, and stroke. Metabolic disorders include diabetes type 2, non-alcoholic fatty liver disease. Mood disorder especially depression; and cognitive decline especially in the elderly. Associations with chronic reflux/Marques’s esophagus some but not all inclusive.\par -Reasons include arousal consistent with hypopnea; respiratory events most prominent in REM sleep or supine position; therefore sleep staging and body position are important for accurate diagnosis and estimation of AHI. \par -Treatment options discussed including CPAP/BiPAP machine, oral appliance, ProVent therapy, Oxy-Aid by [employer], new technologies, or positional sleep.Recommended use of the CPAP machine for moderate (AHI >15), moderate to severe (AHI 15-30) and severe patients (AHI > 30). Recommended weight loss which can reduce AHI especially in weight loss of greater than 5% of BMI. Positional sleep is recommended in those with low AHI, low-moderate BMI, and younger age. For severe sleep apnea, the hypoglossal nerve stimulator was recommended as well. \par \par problem 6: exercise\par -recommended to transition from treadmill/swimming/tread water\par -recommended to use natural protein\par \par problem 7: exercise induced pulmonary hypertension\par -he may qualify for prostacyclin drugs including Uptavi or Orenitram \par -considering Uptavi (pending cardiopulmonary exercise stress test)\par \par Problem 8: muscle cramps (improved)\par -continue theraworx \par -continue to use Optimal Electrolytes (pre-exercise)\par -recommended Topricin ointment \par \par problem 9: poor mechanics of breathing\par -Proper breathing techniques were reviewed with an emphasis of exhalation. Patient instructed to breath in for 1 second and out for four seconds. Patient was encouraged to not talk while walking. \par \par -recommended to hydrate before exercise\par -recommended to use a heart rate monitor while exercising\par -recommended to use a natural protein powder \par \par Problem 10: Health Maintenance/COVID19 Precautions:\par - Clean your hands often. Wash your hands often with soap and water for at least 20 seconds, especially after blowing your nose, coughing, or sneezing, or having been in a public place.\par - If soap and water are not available, use a hand  that contains at least 60% alcohol.\par - To the extent possible, avoid touching high-touch surfaces in public places - elevator buttons, door handles, handrails, handshaking with people, etc. Use a tissue or your sleeve to cover your hand or finger if you must touch something.\par - Wash your hands after touching surfaces in public places.\par - Avoid touching your face, nose, eyes, etc.\par - Clean and disinfect your home to remove germs: practice routine cleaning of frequently touched surfaces (for example: tables, doorknobs, light switches, handles, desks, toilets, faucets, sinks & cell phones)\par - Avoid crowds, especially in poorly ventilated spaces. Your risk of exposure to respiratory viruses like COVID-19 may increase in crowded, closed-in settings with little air circulation if there are people in the crowd who are sick. All patients are recommended to practice social distancing and stay at least 6 feet away from others.\par - Avoid all non-essential travel including plane trips, and especially avoid embarking on cruise ships.\par -If COVID-19 is spreading in your community, take extra measures to put distance between yourself and other people to further reduce your risk of being exposed to this new virus.\par -Stay home as much as possible.\par - Consider ways of getting food brought to your house through family, social, or commercial networks\par -Be aware that the virus has been known to live in the air up to 3 hours post exposure, cardboard up to 24 hours post exposure, copper up to 4 hours post exposure, steel and plastic up to 2-3 days post exposure. Risk of transmission from these surfaces are affected by many variables.\par Immune Support Recommendations:\par -OTC Vitamin C 500mg BID \par -OTC Quercetin 250-500mg BID \par -OTC Zinc 75-100mg per day \par -OTC Melatonin 1 or 2 mg a night \par -OTC Vitamin D 1-4000mg per day \par -OTC Tonic Water 8oz per day\par Asthma and COVID19:\par You need to make sure your asthma is under control. This often requires the use of inhaled corticosteroids (and sometimes oral corticosteroids). Inhaled corticosteroids do not likely reduce your immune system’s ability to fight infections, but oral corticosteroids may. It is important to use the steps above to protect yourself to limit your exposure to any respiratory virus.\par \par problem 11: health maintenance \par - Recommended inguinal hernia evaluation\par -Recommended to take Turmeric\par -Recommended to take Lobelia/Iobelia\par -recommended to try optimal electrolytes \par -recommended yearly flu shot after October 15, refused \par -recommended strep pneumonia vaccines: Prevnar-13 vaccine, followed by Pneumo vaccine 23 one year following (completed)\par -recommended early intervention for URIs\par -recommended regular osteoporosis evaluations\par -recommended early dermatological evaluations\par -recommended after the age of 50 to the age of 70, colonoscopy every 5 years \par - recommend Vitamin D supplements \par - recommend probiotic\par  \par F/U in 4 months \par He is encouraged to call with any changes, concerns, or questions. \par

## 2020-10-06 NOTE — PROCEDURE
[FreeTextEntry1] : FENO was 24; a normal value being less than 25\par Fractional exhaled nitric oxide (FENO) is regarded as a simple, noninvasive method for assessing eosinophilic airway inflammation. Produced by a variety of cells within the lung, nitric oxide (NO) concentrations are generally low in healthy individuals. However, high concentrations of NO appear to be involved in nonspecific host defense mechanisms and chronic inflammatory diseases such as asthma. The American Thoracic Society (ATS) therefore has recommended using FENO to aid in the diagnosis and monitoring of eosinophilic airway inflammation and asthma, and for identifying steroid responsive individuals whose chronic respiratory symptoms may be caused by airway inflammation. \par \par 6 minute walk test reveals a low saturation of 97% with no evidence of dyspnea or fatigue; walked 586.8 meters\par \par \par

## 2020-10-06 NOTE — HISTORY OF PRESENT ILLNESS
[FreeTextEntry1] : Mr. Knapp is a 74 year old male with a history of allergic rhinitis, SERAFIN, PAH, RAD, and SOB presenting to the office today for a follow up visit.\par - recently tried Duaklir which did not feel an effect after one month\par - His sleep is interrupted \par - He has anxiety and pain due to inguinal hernia that wakes him up at night (problems with surgeon/prior surgery)\par - When he eats, groin becomes more swollen and painful \par - No swollen glands or ankles\par - His weight is stable\par - He is eating well \par - denies any headaches, nausea, vomiting, fever, chills, sweats, chest pain, chest pressure, diarrhea, constipation, dysphagia, dizziness, leg swelling, leg pain, itchy eyes, itchy ears, heartburn, reflux, or sour taste in the mouth.\par \par \par

## 2020-10-14 NOTE — PLAN
[FreeTextEntry1] : Once again explained to patient operative findings and reassured him that the current appearance is completely normal. Patient continued to be extremely upset shouting that he had the same hernia and nothing that was explained to him made any sense. Once again reiterated operative findings and technique of repair. Patient insisting that he noted a femoral hernia preop but explained that no such hernia was identified at the time of surgery. Reassured him that healing ridge and seroma would completely resolve over the next 2-3 months.\par After extensive discussion with patient, repeating exclamations multiple times, suggested that if he is still dissatisfied that he seek another surgical opinion for further followup but explained that at present there is no need for any sort of intervention since the appearance is completely normal. Patient stated that he needed time "to make his decision".

## 2020-10-14 NOTE — PHYSICAL EXAM
[de-identified] : Soft, flat, nondistended, nontender. Right groin incision clean and healing very well with normal ridge. Underlying repair fully intact. Very small seroma over the area of the pubic tubercle. Right testicle normal with minimal posterior tenderness.

## 2020-10-14 NOTE — HISTORY OF PRESENT ILLNESS
[de-identified] : Status post repair of moderately large indirect inguinal hernia with Ventrio patch on 7/28/20. Patient now returns on postop day 3 complaining bitterly about groin swelling and incisional pain. Patient extremely upset and dissatisfied with results of surgery, claiming he has the same hernia that he did before surgery. Using Advil for pain relief; has not used any Percocet for pain control. [de-identified] : Aria Arteaga is a 74 year old male here  for a post operative visit. S/P right inguinal hernia repair with medium oval on 07/28/20

## 2020-10-14 NOTE — ASSESSMENT
[FreeTextEntry1] : Status post right inguinal hernia repair; normal postop course except for very small seroma.

## 2020-12-04 ENCOUNTER — APPOINTMENT (OUTPATIENT)
Dept: SURGERY | Facility: CLINIC | Age: 75
End: 2020-12-04

## 2020-12-16 ENCOUNTER — APPOINTMENT (OUTPATIENT)
Dept: SURGERY | Facility: CLINIC | Age: 75
End: 2020-12-16
Payer: MEDICARE

## 2020-12-16 VITALS — TEMPERATURE: 97 F

## 2020-12-16 DIAGNOSIS — R10.31 RIGHT LOWER QUADRANT PAIN: ICD-10-CM

## 2020-12-16 PROCEDURE — 99214 OFFICE O/P EST MOD 30 MIN: CPT

## 2020-12-16 NOTE — ASSESSMENT
[FreeTextEntry1] : postoperative right groin pain. I have explained to Mr Aria that pain after a hernia repair is common and often improves with time. The fact that he reports improvement is encouraging and I would recommend that he give his healing more time.  He was disturbed by my answer and wanted to know if I thought his other Surgeon did the right operation. I have explained to him that it certainly sounds like he had a hernia and fixing it would be appropriate. The 'tacking the ileum up" is not an operation I am aware of and does not sound reasonable. Pt was instructed to return in a few months if his improvement stops.

## 2020-12-16 NOTE — PHYSICAL EXAM
[JVD] : no jugular venous distention  [Normal Thyroid] : the thyroid was normal [Carotid Bruits] : no carotid bruits [Normal Breath Sounds] : Normal breath sounds [Normal Heart Sounds] : normal heart sounds [Normal Rate and Rhythm] : normal rate and rhythm [Abdominal Masses] : No abdominal masses [Abdomen Tenderness] : ~T ~M No abdominal tenderness [Tender] : was nontender [Enlarged] : not enlarged [No Rash or Lesion] : No rash or lesion [Alert] : alert [Oriented to Person] : oriented to person [Oriented to Place] : oriented to place [Oriented to Time] : oriented to time [Calm] : calm [de-identified] : well developed white male in no acute distress [de-identified] : noninjected and nonicteric [de-identified] : without adeopathy [de-identified] : normal bowel sounds, without distension or tenderness [de-identified] : normal testicles. Hernia repair intact [de-identified] : refused [de-identified] : without calf pain or swelling

## 2020-12-16 NOTE — HISTORY OF PRESENT ILLNESS
[de-identified] : pt had an open right inguinal hernia repair with another Surgeon 7/2020. Pt with multiple complaints related to the surgery [de-identified] : 74 year old white male who presented to another surgeon c/o pain, bloating and right groin swelling. Pt had an open right inguinal hernia repair 7/2020.  Pt states that 'my pain and bloating was only after I eat, I think he did the wrong operation. My friend had the same problem and was better once his ileum was tacked up." Pt reports that he had a colonoscopy 2020 that was normal.  His pain now has slightly diminished over the last few months. He denies nausea or vomiting. He denies recent changes in his bowel habits. Pt reports that he now feels a right inguinal hernia(not felt on my exam?).Denies urinary symptoms.

## 2020-12-16 NOTE — REVIEW OF SYSTEMS
[Earache] : no earache [Loss Of Hearing] : hearing loss [Nosebleeds] : no nosebleeds [Nasal Discharge] : no nasal discharge [Sore Throat] : no sore throat [Hoarseness] : no hoarseness [Shortness Of Breath] : shortness of breath [Wheezing] : no wheezing [Cough] : no cough [SOB on Exertion] : shortness of breath during exertion [Orthopnea] : no orthopnea [PND] : no PND [Negative] : Heme/Lymph [FreeTextEntry6] : ;has pulmonary fibrosis [FreeTextEntry9] : has spinal stenosis

## 2021-02-08 ENCOUNTER — APPOINTMENT (OUTPATIENT)
Dept: SURGERY | Facility: CLINIC | Age: 76
End: 2021-02-08
Payer: MEDICARE

## 2021-02-08 VITALS — TEMPERATURE: 96.5 F

## 2021-02-08 PROCEDURE — ZZZZZ: CPT

## 2021-02-08 NOTE — HISTORY OF PRESENT ILLNESS
[de-identified] : pain after inguinal hernia by another surgeon [de-identified] : pt came in irate, 'why wont you operate on me and undo what Dr JONES did". I again explained to him that there is multiple treatments for his pain including medications and possible treatment with a pain specialist. "Dr JONES removed a lipoma and that is what caused the problem, my nephew in [place] states that the lipoma removal is causing my pain, and he is a doctor'. I tried to explain that a chord lipoma is a common finding at which point he told me he would be adding me to his law suite. I explained to him that I would recommend that he see another surgeon for a second opinion.

## 2021-02-12 ENCOUNTER — LABORATORY RESULT (OUTPATIENT)
Age: 76
End: 2021-02-12

## 2021-02-17 ENCOUNTER — NON-APPOINTMENT (OUTPATIENT)
Age: 76
End: 2021-02-17

## 2021-02-17 ENCOUNTER — APPOINTMENT (OUTPATIENT)
Dept: PULMONOLOGY | Facility: CLINIC | Age: 76
End: 2021-02-17
Payer: MEDICARE

## 2021-02-17 VITALS
DIASTOLIC BLOOD PRESSURE: 74 MMHG | RESPIRATION RATE: 16 BRPM | BODY MASS INDEX: 24.48 KG/M2 | SYSTOLIC BLOOD PRESSURE: 126 MMHG | HEIGHT: 67 IN | TEMPERATURE: 97.2 F | WEIGHT: 156 LBS | HEART RATE: 75 BPM | OXYGEN SATURATION: 97 %

## 2021-02-17 PROCEDURE — 94010 BREATHING CAPACITY TEST: CPT

## 2021-02-17 PROCEDURE — 99214 OFFICE O/P EST MOD 30 MIN: CPT | Mod: 25

## 2021-02-17 PROCEDURE — 95012 NITRIC OXIDE EXP GAS DETER: CPT

## 2021-02-17 NOTE — REVIEW OF SYSTEMS
[Negative] : Endocrine [Chest Discomfort] : no chest discomfort [GERD] : no gerd [Diarrhea] : no diarrhea [Constipation] : no constipation [Dysphagia] : no dysphagia [TextBox_[id number]] : abnormal heartbeat

## 2021-02-17 NOTE — ASSESSMENT
[FreeTextEntry1] : Mr. Knapp is a 75 year old male who has a h/o AF/flutter, macular pucker, allergy, low vitamin D, RAD, RAH, and SERAFIN who is now mildly compromised from a pulmonary perspective. His number one issue is right inguinal hernia issues. (limiting ambulation)\par \par His progressive SOB is currently stable and is multifactorial due to:\par -exercise\par -PAH\par -RADS\par -?cardiac disease\par -poor mechanics of breathing\par \par problem 1: history of PAH, exercise induced \par -previously failed:, Revatio, Adcirca, Calcium channel blockers, and Tracleer\par -considering Uptavi (IP Antagonist) / Tyvaso\par \par -Disorder of the pulmonary arteries, important to distinguish the difference between pulmonary arterial hypertension which is idiopathic to secondary pulmonary hypertension which is related to heart disease being diastolic dysfunction or congestive heart failure. Diagnostics include an initial echocardiogram evaluating the pulmonary artery pressures, if this is abnormal, to proceed with a VQ scan as well as a CTPA and an eventual right heart catheterization (No medication can be prescribed until the right heart catheterization). If present, the evaluation will include rheumatological blood testing, HIV testing, and potential evaluation for cirrhosis. Drug classes include PED5 (Revatio, Adcirca) ETRA (Tracleer, Macitentan, Letairis), Soluble guanylate cyclase (Adempas) Prostacyclins (Uptravi, Tyavso, Ventavis, Remodulin, or Orenitram derivatives). \par \par problem 2: RADS; \par - trial of new triple Ex MDI\par -failed Utibron,Bevespi, Seebri, Spiriva, Incruse, Anoro, Duaklir\par -continue to use Ventolin or Xopenex  PRN and before exercise, PRN\par \par -Inhaler technique reviewed as well as oral hygiene techniques reviewed with patient. Avoidance of cold air, extremes of temperature, rescue inhaler should be used before exercise. Order of medication reviewed with patient. Recommended use of a cool mist humidifier in the bedroom. \par \par Problem 2A: Allergies\par -continue Claritin 10 mg QD\par Environmental measures for allergies were encouraged including mattress and pillow cover, air purifier, and environmental controls.\par \par problem 3: prior CTA\par -ruled out pulmonary embolism and any underlying disease\par \par problem 4: cardiac component \par -continue to follow up with cardiologist - repeat cardiopulmonary stress test \par -his recent pulmonary cardiac stress test was normal\par \par problem 5: SERAFIN\par -continue to use Oxy-Aid by [employer] / "Sleep Rite"\par -recommended to try Boron supplements 6 mg BID\par -Sleep apnea is associated with adverse clinical consequences which an affect most organ systems. Cardiovascular disease risk includes arrhythmias, atrial fibrillation, hypertension, coronary artery disease, and stroke. Metabolic disorders include diabetes type 2, non-alcoholic fatty liver disease. Mood disorder especially depression; and cognitive decline especially in the elderly. Associations with chronic reflux/Marques’s esophagus some but not all inclusive.\par -Reasons include arousal consistent with hypopnea; respiratory events most prominent in REM sleep or supine position; therefore sleep staging and body position are important for accurate diagnosis and estimation of AHI. \par -Treatment options discussed including CPAP/BiPAP machine, oral appliance, ProVent therapy, Oxy-Aid by [employer], new technologies, or positional sleep.Recommended use of the CPAP machine for moderate (AHI >15), moderate to severe (AHI 15-30) and severe patients (AHI > 30). Recommended weight loss which can reduce AHI especially in weight loss of greater than 5% of BMI. Positional sleep is recommended in those with low AHI, low-moderate BMI, and younger age. For severe sleep apnea, the hypoglossal nerve stimulator was recommended as well. \par \par problem 6: exercise\par -recommended to transition from treadmill/swimming/tread water\par -recommended to use natural protein\par \par problem 7: exercise induced pulmonary hypertension\par -he may qualify for prostacyclin drugs including Uptavi or Orenitram or Tyvaso\par -considering Uptavi (pending cardiopulmonary exercise stress test) / Tyvaso\par -Recommended to have an echocardiogram to evaluate for PAH with Dr. Wilkinson\par \par Problem 8: muscle cramps (improved)\par -continue theraworx \par -continue to use Optimal Electrolytes (pre-exercise)\par -recommended Topricin ointment \par \par problem 9: poor mechanics of breathing\par -Proper breathing techniques were reviewed with an emphasis of exhalation. Patient instructed to breath in for 1 second and out for four seconds. Patient was encouraged to not talk while walking. \par \par -recommended to hydrate before exercise\par -recommended to use a heart rate monitor while exercising\par -recommended to use a natural protein powder \par \par Problem 10: Health Maintenance/COVID19 Precautions:\par - Clean your hands often. Wash your hands often with soap and water for at least 20 seconds, especially after blowing your nose, coughing, or sneezing, or having been in a public place.\par - If soap and water are not available, use a hand  that contains at least 60% alcohol.\par - To the extent possible, avoid touching high-touch surfaces in public places - elevator buttons, door handles, handrails, handshaking with people, etc. Use a tissue or your sleeve to cover your hand or finger if you must touch something.\par - Wash your hands after touching surfaces in public places.\par - Avoid touching your face, nose, eyes, etc.\par - Clean and disinfect your home to remove germs: practice routine cleaning of frequently touched surfaces (for example: tables, doorknobs, light switches, handles, desks, toilets, faucets, sinks & cell phones)\par - Avoid crowds, especially in poorly ventilated spaces. Your risk of exposure to respiratory viruses like COVID-19 may increase in crowded, closed-in settings with little air circulation if there are people in the crowd who are sick. All patients are recommended to practice social distancing and stay at least 6 feet away from others.\par - Avoid all non-essential travel including plane trips, and especially avoid embarking on cruise ships.\par -If COVID-19 is spreading in your community, take extra measures to put distance between yourself and other people to further reduce your risk of being exposed to this new virus.\par -Stay home as much as possible.\par - Consider ways of getting food brought to your house through family, social, or commercial networks\par -Be aware that the virus has been known to live in the air up to 3 hours post exposure, cardboard up to 24 hours post exposure, copper up to 4 hours post exposure, steel and plastic up to 2-3 days post exposure. Risk of transmission from these surfaces are affected by many variables.\par Immune Support Recommendations:\par -OTC Vitamin C 500mg BID \par -OTC Quercetin 250-500mg BID \par -OTC Zinc 75-100mg per day \par -OTC Melatonin 1 or 2 mg a night \par -OTC Vitamin D 1-4000mg per day \par -OTC Tonic Water 8oz per day\par Asthma and COVID19:\par You need to make sure your asthma is under control. This often requires the use of inhaled corticosteroids (and sometimes oral corticosteroids). Inhaled corticosteroids do not likely reduce your immune system’s ability to fight infections, but oral corticosteroids may. It is important to use the steps above to protect yourself to limit your exposure to any respiratory virus.\par \par problem 11: health maintenance \par - Recommended inguinal hernia evaluation\par -Recommended to take Turmeric\par -Recommended to take Lobelia/Iobelia\par -recommended to try optimal electrolytes \par -recommended yearly flu shot after October 15, refused \par -recommended strep pneumonia vaccines: Prevnar-13 vaccine, followed by Pneumo vaccine 23 one year following (completed)\par -recommended early intervention for URIs\par -recommended regular osteoporosis evaluations\par -recommended early dermatological evaluations\par -recommended after the age of 50 to the age of 70, colonoscopy every 5 years \par - recommend Vitamin D supplements \par - recommend probiotic\par  \par F/U in 4 months \par He is encouraged to call with any changes, concerns, or questions. \par

## 2021-02-17 NOTE — ADDENDUM
[FreeTextEntry1] : Documented by Jay Aguilera acting as a scribe for Dr. Pato Knight on 02/17/2021.\par \par All medical record entries made by the Scribe were at my, Dr. Pato Knight's, direction and personally dictated by me on 02/17/2021. I have reviewed the chart and agree that the record accurately reflects my personal performance of the history, physical exam, assessment and plan. I have also personally directed, reviewed, and agree with the discharge instructions.

## 2021-02-17 NOTE — HISTORY OF PRESENT ILLNESS
[FreeTextEntry1] : Mr. Knapp is a 75 year old male with a history of allergic rhinitis, SERAFIN, PAH, RAD, and SOB presenting to the office today for a follow up visit. His chief complaint is lipoma\par - he notes he started taking his inhaler, but it irritated his throat. He took it intermittently, and then stopped\par -he reports his HR has not been regular since his cardioversion\par -he notes his weight is stable\par -he states he is eating well, but on an odd schedule as per the recovery of his hernia surgery. He was supposed to have a lipoma excision but the doctor performed the wrong surgery.\par -he states he has been exercising by swimming 3x per week, but hasn’t returned to other exercise due to the surgery\par -he notes he doesn’t sleep well. He sleeps in periods of 2-3 hours, and then wakes up from the pressure caused by lipoma\par -he denies any chest pain, chest pressure, diarrhea, constipation, dysphagia, dizziness, sour taste in the mouth, leg swelling, leg pain, itchy eyes, itchy ears, heartburn, reflux, myalgias or arthralgias.

## 2021-02-17 NOTE — PROCEDURE
[FreeTextEntry1] : PFT revealed normal flows, with a FEV1 of 3.43L, which is 131% of predicted, with a normal flow volume loop\par \par FENO was 17; a normal value being less than 25\par Fractional exhaled nitric oxide (FENO) is regarded as a simple, noninvasive method for assessing eosinophilic airway inflammation. Produced by a variety of cells within the lung, nitric oxide (NO) concentrations are generally low in healthy individuals. However, high concentrations of NO appear to be involved in nonspecific host defense mechanisms and chronic inflammatory diseases such as asthma. The American Thoracic Society (ATS) therefore has recommended using FENO to aid in the diagnosis and monitoring of eosinophilic airway inflammation and asthma, and for identifying steroid responsive individuals whose chronic respiratory symptoms may be caused by airway inflammation.

## 2021-06-17 ENCOUNTER — NON-APPOINTMENT (OUTPATIENT)
Age: 76
End: 2021-06-17

## 2021-06-17 ENCOUNTER — APPOINTMENT (OUTPATIENT)
Dept: PULMONOLOGY | Facility: CLINIC | Age: 76
End: 2021-06-17
Payer: MEDICARE

## 2021-06-17 VITALS
HEIGHT: 67 IN | WEIGHT: 155 LBS | OXYGEN SATURATION: 98 % | TEMPERATURE: 97.7 F | HEART RATE: 81 BPM | BODY MASS INDEX: 24.33 KG/M2 | RESPIRATION RATE: 16 BRPM | SYSTOLIC BLOOD PRESSURE: 122 MMHG | DIASTOLIC BLOOD PRESSURE: 70 MMHG

## 2021-06-17 PROCEDURE — 95012 NITRIC OXIDE EXP GAS DETER: CPT

## 2021-06-17 PROCEDURE — 94618 PULMONARY STRESS TESTING: CPT

## 2021-06-17 PROCEDURE — 99214 OFFICE O/P EST MOD 30 MIN: CPT | Mod: 25

## 2021-06-17 PROCEDURE — 94010 BREATHING CAPACITY TEST: CPT

## 2021-06-17 NOTE — HISTORY OF PRESENT ILLNESS
[FreeTextEntry1] : Mr. Knapp is a 75 year old male with a history of allergic rhinitis, SERAFIN, PAH, RAD, and SOB presenting to the office today for a follow up visit. His chief complaint is \par \par -he notes s/p hernia repair at [facility] in [place] ([place] recovered well 5/2021\par -he notes return to exercise swimming, cycling\par -he notes intermittent TUCKER following exercise\par -he notes mild visual issues related to macular pucker left eye and mild hemorrhage\par -he notes weight returned to baseline 155 lbs\par -he notes sleep quality improved\par -he denies cough\par -he denies wheeze\par -he notes mild myalgias and muscle cramps residual to exercise, controlled with OTC supplements\par -he notes mild fatigue with exercise\par \par \par -denies any chest pain, chest pressure, diarrhea, constipation, dysphagia, sour taste in the mouth, dizziness, leg swelling, leg pain, myalgias, arthralgias, itchy eyes, itchy ears, heartburn, or reflux.\par \par

## 2021-06-17 NOTE — PROCEDURE
[FreeTextEntry1] : FENO was 28; a normal value being less than 25\par Fractional exhaled nitric oxide (FENO) is regarded as a simple, noninvasive method for assessing eosinophilic airway inflammation. Produced by a variety of cells within the lung, nitric oxide (NO) concentrations are generally low in healthy individuals. However, high concentrations of NO appear to be involved in nonspecific host defense mechanisms and chronic inflammatory diseases such as asthma. The American Thoracic Society (ATS) therefore has recommended using FENO to aid in the diagnosis and monitoring of eosinophilic airway inflammation and asthma, and for identifying steroid responsive individuals whose chronic respiratory symptoms may be caused by airway inflammation. \par \par 6 minute walk test reveals a low saturation of 97 % with no evidence of dyspnea or fatigue; walked 586.8   meters\par  \par \par PFT revealed normal flows, with a FEV1 of 3.45 L, which is 132% of predicted, normal lung volumes, and with a normal flow volume loop.

## 2021-06-17 NOTE — ADDENDUM
[FreeTextEntry1] : Documented by Chapito Beltran acting as a scribe for Dr. Pato Knight on 06/17/2021.\par \par All medical record entries made by the Scribe were at my, Dr. Pato Knight's, direction and personally dictated by me on 06/17/2021 . I have reviewed the chart and agree that the record accurately reflects my personal performance of the history, physical exam, assessment and plan. I have also personally directed, reviewed, and agree with the discharge instructions. \par

## 2021-06-17 NOTE — PHYSICAL EXAM
[General Appearance - Well Developed] : well developed [Normal Appearance] : normal appearance [General Appearance - Well Nourished] : well nourished [Well Groomed] : well groomed [No Deformities] : no deformities [General Appearance - In No Acute Distress] : no acute distress [Normal Conjunctiva] : the conjunctiva exhibited no abnormalities [Eyelids - No Xanthelasma] : the eyelids demonstrated no xanthelasmas [Normal Oropharynx] : normal oropharynx [II] : II [Neck Cervical Mass (___cm)] : no neck mass was observed [Neck Appearance] : the appearance of the neck was normal [Jugular Venous Distention Increased] : there was no jugular-venous distention [Thyroid Diffuse Enlargement] : the thyroid was not enlarged [Thyroid Nodule] : there were no palpable thyroid nodules [Heart Sounds] : normal S1 and S2 [Heart Rate And Rhythm] : heart rate and rhythm were normal [Murmurs] : no murmurs present [Respiration, Rhythm And Depth] : normal respiratory rhythm and effort [Exaggerated Use Of Accessory Muscles For Inspiration] : no accessory muscle use [Auscultation Breath Sounds / Voice Sounds] : lungs were clear to auscultation bilaterally [Abdomen Soft] : soft [Abdomen Tenderness] : non-tender [Abdomen Mass (___ Cm)] : no abdominal mass palpated [Abnormal Walk] : normal gait [Gait - Sufficient For Exercise Testing] : the gait was sufficient for exercise testing [Nail Clubbing] : no clubbing of the fingernails [Cyanosis, Localized] : no localized cyanosis [Petechial Hemorrhages (___cm)] : no petechial hemorrhages [Skin Color & Pigmentation] : normal skin color and pigmentation [] : no rash [No Venous Stasis] : no venous stasis [Skin Lesions] : no skin lesions [No Skin Ulcers] : no skin ulcer [No Xanthoma] : no  xanthoma was observed [Sensation] : the sensory exam was normal to light touch and pinprick [Deep Tendon Reflexes (DTR)] : deep tendon reflexes were 2+ and symmetric [No Focal Deficits] : no focal deficits [Oriented To Time, Place, And Person] : oriented to person, place, and time [Impaired Insight] : insight and judgment were intact [Affect] : the affect was normal [FreeTextEntry1] : I:E ratio 1:3; clear

## 2021-06-17 NOTE — ASSESSMENT
[FreeTextEntry1] : Mr. Knapp is a 75 year old male who has a h/o AF/flutter, macular pucker, allergy, low vitamin D, RAD, RAH, and SERAFIN who is now mildly compromised from a pulmonary perspective. His number one issue is TUCKER and fatigue with exercise. \par \par His progressive SOB is currently stable and is multifactorial due to:\par -exercise\par -PAH\par -RADS\par -?cardiac disease\par -poor mechanics of breathing\par \par problem 1: history of PAH, exercise induced \par -previously failed:, Revatio, Adcirca, Calcium channel blockers, and Tracleer\par -considering Uptavi (IP Antagonist) / Tyvaso pre exercise (or Ventavis) \par \par -Disorder of the pulmonary arteries, important to distinguish the difference between pulmonary arterial hypertension which is idiopathic to secondary pulmonary hypertension which is related to heart disease being diastolic dysfunction or congestive heart failure. Diagnostics include an initial echocardiogram evaluating the pulmonary artery pressures, if this is abnormal, to proceed with a VQ scan as well as a CTPA and an eventual right heart catheterization (No medication can be prescribed until the right heart catheterization). If present, the evaluation will include rheumatological blood testing, HIV testing, and potential evaluation for cirrhosis. Drug classes include PED5 (Revatio, Adcirca) ETRA (Tracleer, Macitentan, Letairis), Soluble guanylate cyclase (Adempas) Prostacyclins (Uptravi, Tyavso, Ventavis, Remodulin, or Orenitram derivatives). \par \par problem 2: RADS; \par -s/p trial of new triple Ex MDI\par -add Breztri 2 inhalations BID \par -failed Utibron,Bevespi, Seebri, Spiriva, Incruse, Anoro, Duaklir\par -continue to use Ventolin or Xopenex  PRN and before exercise, PRN\par \par -Inhaler technique reviewed as well as oral hygiene techniques reviewed with patient. Avoidance of cold air, extremes of temperature, rescue inhaler should be used before exercise. Order of medication reviewed with patient. Recommended use of a cool mist humidifier in the bedroom. \par \par Problem 2A: Allergies\par -continue Claritin 10 mg QD\par Environmental measures for allergies were encouraged including mattress and pillow cover, air purifier, and environmental controls.\par \par problem 3: prior CTA\par -ruled out pulmonary embolism and any underlying disease\par \par problem 4: cardiac component \par -continue to follow up with cardiologist - repeat cardiopulmonary stress test \par -his recent pulmonary cardiac stress test was normal\par \par problem 5: SERAFIN\par -continue to use Oxy-Aid by [employer] / "Sleep Rite"\par -recommended to try Boron supplements 6 mg BID\par -Sleep apnea is associated with adverse clinical consequences which an affect most organ systems. Cardiovascular disease risk includes arrhythmias, atrial fibrillation, hypertension, coronary artery disease, and stroke. Metabolic disorders include diabetes type 2, non-alcoholic fatty liver disease. Mood disorder especially depression; and cognitive decline especially in the elderly. Associations with chronic reflux/Marques’s esophagus some but not all inclusive.\par -Reasons include arousal consistent with hypopnea; respiratory events most prominent in REM sleep or supine position; therefore sleep staging and body position are important for accurate diagnosis and estimation of AHI. \par -Treatment options discussed including CPAP/BiPAP machine, oral appliance, ProVent therapy, Oxy-Aid by [employer], new technologies, or positional sleep.Recommended use of the CPAP machine for moderate (AHI >15), moderate to severe (AHI 15-30) and severe patients (AHI > 30). Recommended weight loss which can reduce AHI especially in weight loss of greater than 5% of BMI. Positional sleep is recommended in those with low AHI, low-moderate BMI, and younger age. For severe sleep apnea, the hypoglossal nerve stimulator was recommended as well. \par \par problem 6: exercise\par -recommended to transition from treadmill/swimming/tread water\par -recommended to use natural protein\par \par problem 7: exercise induced pulmonary hypertension\par -he may qualify for prostacyclin drugs including Uptavi or Orenitram or Tyvaso (#1) (or Ventavis) \par -considering Uptavi (pending cardiopulmonary exercise stress test) / Tyvaso\par -Recommended to have an echocardiogram to evaluate for PAH with Dr. Wilkinson\par \par Problem 8: muscle cramps (improved)\par -continue theraworx \par -continue to use Optimal Electrolytes (pre-exercise)\par -recommended Topricin ointment \par \par problem 9: poor mechanics of breathing\par -Proper breathing techniques were reviewed with an emphasis of exhalation. Patient instructed to breath in for 1 second and out for four seconds. Patient was encouraged to not talk while walking. \par \par -recommended to hydrate before exercise\par -recommended to use a heart rate monitor while exercising\par -recommended to use a natural protein powder \par \par Problem 10: Health Maintenance/COVID19 Precautions:\par -s/p J&J COVID 19 vaccine \par - Clean your hands often. Wash your hands often with soap and water for at least 20 seconds, especially after blowing your nose, coughing, or sneezing, or having been in a public place.\par - If soap and water are not available, use a hand  that contains at least 60% alcohol.\par - To the extent possible, avoid touching high-touch surfaces in public places - elevator buttons, door handles, handrails, handshaking with people, etc. Use a tissue or your sleeve to cover your hand or finger if you must touch something.\par - Wash your hands after touching surfaces in public places.\par - Avoid touching your face, nose, eyes, etc.\par - Clean and disinfect your home to remove germs: practice routine cleaning of frequently touched surfaces (for example: tables, doorknobs, light switches, handles, desks, toilets, faucets, sinks & cell phones)\par - Avoid crowds, especially in poorly ventilated spaces. Your risk of exposure to respiratory viruses like COVID-19 may increase in crowded, closed-in settings with little air circulation if there are people in the crowd who are sick. All patients are recommended to practice social distancing and stay at least 6 feet away from others.\par - Avoid all non-essential travel including plane trips, and especially avoid embarking on cruise ships.\par -If COVID-19 is spreading in your community, take extra measures to put distance between yourself and other people to further reduce your risk of being exposed to this new virus.\par -Stay home as much as possible.\par - Consider ways of getting food brought to your house through family, social, or commercial networks\par -Be aware that the virus has been known to live in the air up to 3 hours post exposure, cardboard up to 24 hours post exposure, copper up to 4 hours post exposure, steel and plastic up to 2-3 days post exposure. Risk of transmission from these surfaces are affected by many variables.\par Immune Support Recommendations:\par -OTC Vitamin C 500mg BID \par -OTC Quercetin 250-500mg BID \par -OTC Zinc 75-100mg per day \par -OTC Melatonin 1 or 2 mg a night \par -OTC Vitamin D 1-4000mg per day \par -OTC Tonic Water 8oz per day\par Asthma and COVID19:\par You need to make sure your asthma is under control. This often requires the use of inhaled corticosteroids (and sometimes oral corticosteroids). Inhaled corticosteroids do not likely reduce your immune system’s ability to fight infections, but oral corticosteroids may. It is important to use the steps above to protect yourself to limit your exposure to any respiratory virus.\par \par problem 11: health maintenance \par - Recommended inguinal hernia evaluation\par -Recommended to take Turmeric\par -Recommended to take Lobelia/Iobelia\par -recommended to try optimal electrolytes \par -recommended yearly flu shot after October 15, refused \par -recommended strep pneumonia vaccines: Prevnar-13 vaccine, followed by Pneumo vaccine 23 one year following (completed)\par -recommended early intervention for URIs\par -recommended regular osteoporosis evaluations\par -recommended early dermatological evaluations\par -recommended after the age of 50 to the age of 70, colonoscopy every 5 years \par - recommend Vitamin D supplements \par - recommend probiotic\par  \par F/U in 4 months \par He is encouraged to call with any changes, concerns, or questions. \par

## 2021-06-21 NOTE — ASU PATIENT PROFILE, ADULT - NS PRO AD PATIENT TYPE
Bedside and Verbal shift change report given to Eliza Coppola (oncoming nurse) by Alycia Amaya (offgoing nurse). Report included the following information SBAR, Kardex, Procedure Summary, Intake/Output, MAR, Accordion, Recent Results and Med Rec Status. Health Care Proxy (HCP)

## 2021-06-24 ENCOUNTER — NON-APPOINTMENT (OUTPATIENT)
Age: 76
End: 2021-06-24

## 2021-06-28 NOTE — REASON FOR VISIT
Jemma Hamilton is a 8 Anshul Saul MD as PCP - Greene County General Hospital EmpSierra Tucson Follow-Up] : a follow-up visit [FreeTextEntry1] : allergic rhinitis, SERAFIN, PAH, RAD, and SOB

## 2021-06-30 ENCOUNTER — NON-APPOINTMENT (OUTPATIENT)
Age: 76
End: 2021-06-30

## 2021-09-24 ENCOUNTER — APPOINTMENT (OUTPATIENT)
Dept: DISASTER EMERGENCY | Facility: CLINIC | Age: 76
End: 2021-09-24

## 2021-09-25 LAB — SARS-COV-2 N GENE NPH QL NAA+PROBE: NOT DETECTED

## 2021-09-27 ENCOUNTER — OUTPATIENT (OUTPATIENT)
Dept: OUTPATIENT SERVICES | Facility: HOSPITAL | Age: 76
LOS: 1 days | Discharge: HOME | End: 2021-09-27
Payer: MEDICARE

## 2021-09-27 VITALS
WEIGHT: 154.98 LBS | OXYGEN SATURATION: 99 % | HEART RATE: 80 BPM | SYSTOLIC BLOOD PRESSURE: 150 MMHG | RESPIRATION RATE: 18 BRPM | DIASTOLIC BLOOD PRESSURE: 93 MMHG | TEMPERATURE: 98 F | HEIGHT: 68 IN

## 2021-09-27 VITALS
DIASTOLIC BLOOD PRESSURE: 89 MMHG | SYSTOLIC BLOOD PRESSURE: 121 MMHG | RESPIRATION RATE: 16 BRPM | HEART RATE: 63 BPM | OXYGEN SATURATION: 98 %

## 2021-09-27 DIAGNOSIS — I27.21 SECONDARY PULMONARY ARTERIAL HYPERTENSION: ICD-10-CM

## 2021-09-27 LAB
ALBUMIN SERPL ELPH-MCNC: 4.6 G/DL — SIGNIFICANT CHANGE UP (ref 3.3–5)
ALP SERPL-CCNC: 96 U/L — SIGNIFICANT CHANGE UP (ref 40–120)
ALT FLD-CCNC: 14 U/L — SIGNIFICANT CHANGE UP (ref 10–45)
ANION GAP SERPL CALC-SCNC: 15 MMOL/L — SIGNIFICANT CHANGE UP (ref 5–17)
AST SERPL-CCNC: 18 U/L — SIGNIFICANT CHANGE UP (ref 10–40)
BILIRUB SERPL-MCNC: 0.6 MG/DL — SIGNIFICANT CHANGE UP (ref 0.2–1.2)
BUN SERPL-MCNC: 22 MG/DL — SIGNIFICANT CHANGE UP (ref 7–23)
CALCIUM SERPL-MCNC: 9 MG/DL — SIGNIFICANT CHANGE UP (ref 8.4–10.5)
CHLORIDE SERPL-SCNC: 105 MMOL/L — SIGNIFICANT CHANGE UP (ref 96–108)
CO2 SERPL-SCNC: 21 MMOL/L — LOW (ref 22–31)
CREAT SERPL-MCNC: 1.03 MG/DL — SIGNIFICANT CHANGE UP (ref 0.5–1.3)
GLUCOSE SERPL-MCNC: 100 MG/DL — HIGH (ref 70–99)
HCT VFR BLD CALC: 41.4 % — SIGNIFICANT CHANGE UP (ref 39–50)
HGB BLD-MCNC: 13.3 G/DL — SIGNIFICANT CHANGE UP (ref 13–17)
MCHC RBC-ENTMCNC: 30.7 PG — SIGNIFICANT CHANGE UP (ref 27–34)
MCHC RBC-ENTMCNC: 32.1 GM/DL — SIGNIFICANT CHANGE UP (ref 32–36)
MCV RBC AUTO: 95.6 FL — SIGNIFICANT CHANGE UP (ref 80–100)
NRBC # BLD: 0 /100 WBCS — SIGNIFICANT CHANGE UP (ref 0–0)
PLATELET # BLD AUTO: 263 K/UL — SIGNIFICANT CHANGE UP (ref 150–400)
POTASSIUM SERPL-MCNC: 4 MMOL/L — SIGNIFICANT CHANGE UP (ref 3.5–5.3)
POTASSIUM SERPL-SCNC: 4 MMOL/L — SIGNIFICANT CHANGE UP (ref 3.5–5.3)
PROT SERPL-MCNC: 6.9 G/DL — SIGNIFICANT CHANGE UP (ref 6–8.3)
RBC # BLD: 4.33 M/UL — SIGNIFICANT CHANGE UP (ref 4.2–5.8)
RBC # FLD: 13.1 % — SIGNIFICANT CHANGE UP (ref 10.3–14.5)
SODIUM SERPL-SCNC: 141 MMOL/L — SIGNIFICANT CHANGE UP (ref 135–145)
WBC # BLD: 3.82 K/UL — SIGNIFICANT CHANGE UP (ref 3.8–10.5)
WBC # FLD AUTO: 3.82 K/UL — SIGNIFICANT CHANGE UP (ref 3.8–10.5)

## 2021-09-27 PROCEDURE — 80053 COMPREHEN METABOLIC PANEL: CPT

## 2021-09-27 PROCEDURE — 93451 RIGHT HEART CATH: CPT | Mod: 26

## 2021-09-27 PROCEDURE — C1889: CPT

## 2021-09-27 PROCEDURE — 93010 ELECTROCARDIOGRAM REPORT: CPT

## 2021-09-27 PROCEDURE — 93005 ELECTROCARDIOGRAM TRACING: CPT

## 2021-09-27 PROCEDURE — 93451 RIGHT HEART CATH: CPT

## 2021-09-27 PROCEDURE — 85027 COMPLETE CBC AUTOMATED: CPT

## 2021-09-27 PROCEDURE — C1894: CPT

## 2021-09-27 PROCEDURE — 99152 MOD SED SAME PHYS/QHP 5/>YRS: CPT

## 2021-09-27 RX ORDER — CHOLECALCIFEROL (VITAMIN D3) 125 MCG
1 CAPSULE ORAL
Qty: 0 | Refills: 0 | DISCHARGE

## 2021-09-27 RX ORDER — ACLIDINIUM BROMIDE AND FORMOTEROL FUMARTE 400; 12 UG/1; UG/1
1 POWDER, METERED RESPIRATORY (INHALATION)
Qty: 0 | Refills: 0 | DISCHARGE

## 2021-09-27 NOTE — H&P CARDIOLOGY - NSICDXPASTMEDICALHX_GEN_ALL_CORE_FT
PAST MEDICAL HISTORY:  A-fib ablation 2011, cardioversion 2015    H/O orthostatic hypotension long time ago    H/O pulmonary fibrosis     H/O pulmonary hypertension     Tachycardia

## 2021-09-27 NOTE — ASU DISCHARGE PLAN (ADULT/PEDIATRIC) - ***IN THE EVENT THAT YOU DEVELOP A COMPLICATION AND YOU ARE UNABLE TO REACH YOUR OWN PHYSICIAN, YOU MAY CONTACT:
[] [facility]) [facility]  [address]  [phone]  F: [phone] [x] [address]  [facility]   [address]  P: [phone]  F: [phone] [] [address]  P: [phone]  F: [phone] [] [address]  P: [phone]  F: [phone] [] [address]  [facility]   [address]   [place]: [phone]  F: [phone]      Physical Therapy Daily Treatment Note    Date:  2021  Patient Name:  Bimal Nina    :  2007  MRN: [id number]  Physician: Dr. Maite Landry: Retail  Medical Diagnosis: J17.526D (Regulo Macias PT Statement Selected

## 2021-09-27 NOTE — ASU DISCHARGE PLAN (ADULT/PEDIATRIC) - CARE PROVIDER_API CALL
Pato Knight (MD)  Internal Medicine; Pulmonary Disease  [address] [facility], [address]  [place] [address]  Phone: [phone]  Fax: [phone]  Follow Up Time:

## 2021-09-27 NOTE — ASU DISCHARGE PLAN (ADULT/PEDIATRIC) - ASU DC SPECIAL INSTRUCTIONSFT
No heavy lifting, strenuous activity, bending, straining or unnecessary stair climbing  for 2 weeks. No sex for 1 week.  No driving for 2 days. You may shower 24 hours following procedure but avoid baths and swimming for 1 week. Check groin site for bleeding and/or swelling daily following procedure. Call your doctor/cardiologist immediately should it occur or if you have increased/persistent pain at the site. Follow up with your cardiologist in 1- 2 weeks. You may call [facility] at [phone] or [phone] after office hours and weekends  with any questions or concerns following your procedure. Take medications as prescribed.

## 2021-09-27 NOTE — H&P CARDIOLOGY - HISTORY OF PRESENT ILLNESS
This is a 75 year old male with PMH ASHD, Afib/flutter s/p ablation and cardioversion not on AC, PSVT in the past, reactive airway disease, SERAFIN,  pulmonary fibrosis and pulmonary hypertension presented to pulmonologist, Dr. Knight, for evaluation of fatigue, SOB/TUCKER following exercise.  Denies chest pain/pressure, dizziness, diaphoresis, palpitations, nausea, vomiting, peripheral edema, recent weight gain, or syncope.  No implanted monitoring devices. Pt. may qualify for prostaglandin drugs including Uptravi, Orenitram, or Tyvaso and presents for further evaluation and RHC.

## 2021-10-12 ENCOUNTER — APPOINTMENT (OUTPATIENT)
Dept: PULMONOLOGY | Facility: CLINIC | Age: 76
End: 2021-10-12
Payer: MEDICARE

## 2021-10-12 VITALS
DIASTOLIC BLOOD PRESSURE: 70 MMHG | WEIGHT: 155 LBS | HEART RATE: 84 BPM | SYSTOLIC BLOOD PRESSURE: 120 MMHG | TEMPERATURE: 96.5 F | OXYGEN SATURATION: 99 % | HEIGHT: 67 IN | RESPIRATION RATE: 16 BRPM | BODY MASS INDEX: 24.33 KG/M2

## 2021-10-12 PROCEDURE — 99214 OFFICE O/P EST MOD 30 MIN: CPT

## 2021-10-12 NOTE — ADDENDUM
[FreeTextEntry1] : Documented by Jay Barfield acting as a scribe for Dr. Pato Knight on (10/12/2021).\par \par All medical record entries made by the Scribe were at my, Dr. Pato Knight's, direction and personally dictated by me on (10/12/2021). I have reviewed the chart and agree that the record accurately reflects my personal performance of the history, physical exam, assessment and plan. I have also personally directed, reviewed, and agree with the discharge instructions.\par

## 2021-10-12 NOTE — HISTORY OF PRESENT ILLNESS
[FreeTextEntry1] : Mr. Knapp is a 75 year old male with a history of allergic rhinitis, SERAFIN, PAH, RAD, and SOB presenting to the office today for a follow up visit. His chief complaint is \par -he notes his allergies have been exacerbated\par -he notes eating well\par -he notes his appetite is good\par -he notes getting 3-4 hours of continuous sleep and then he wakes up and is unable to fall back asleep\par -he notes Breztri has not helped\par -he notes leg weakness and TUCKER\par \par patient denies any headaches, nausea, vomiting, fever, chills, sweats, chest pain, chest pressure, palpitations, coughing, wheezing, fatigue, diarrhea, constipation, dysphagia, myalgias, dizziness, leg swelling, leg pain, itchy eyes, itchy ears, heartburn, reflux or sour taste in the mouth

## 2021-10-12 NOTE — ASSESSMENT
[FreeTextEntry1] : Mr. Knapp is a 75 year old male who has a h/o AF/flutter, macular pucker, allergy, low vitamin D, RAD, PAH, and SERAFIN who is now mildly compromised from a pulmonary perspective. His number one issue is TUCKER and fatigue with exercise (still)\par \par His progressive SOB is currently stable and is multifactorial due to:\par -exercise\par -PAH\par -RADS\par -?cardiac disease\par -poor mechanics of breathing\par \par problem 1: history of PAH, exercise induced (RHC @ rest - No PAH 9/2021)\par -previously failed:, Revatio, Adcirca, Calcium channel blockers, and Tracleer\par -considering Uptavi (IP Antagonist) / Tyvaso pre exercise (or Ventavis) \par \par -Disorder of the pulmonary arteries, important to distinguish the difference between pulmonary arterial hypertension which is idiopathic to secondary pulmonary hypertension which is related to heart disease being diastolic dysfunction or congestive heart failure. Diagnostics include an initial echocardiogram evaluating the pulmonary artery pressures, if this is abnormal, to proceed with a VQ scan as well as a CTPA and an eventual right heart catheterization (No medication can be prescribed until the right heart catheterization). If present, the evaluation will include rheumatological blood testing, HIV testing, and potential evaluation for cirrhosis. Drug classes include PED5 (Revatio, Adcirca) ETRA (Tracleer, Macitentan, Letairis), Soluble guanylate cyclase (Adempas) Prostacyclins (Uptravi, Tyavso, Ventavis, Remodulin, or Orenitram derivatives). \par \par problem 2: RADS; \par -s/p trial of new triple Ex MDI\par -failed Utibron,Bevespi, Seebri, Spiriva, Incruse, Anoro, Duaklir, Breztri\par -continue to use Ventolin or Xopenex  PRN and before exercise, PRN\par \par -Inhaler technique reviewed as well as oral hygiene techniques reviewed with patient. Avoidance of cold air, extremes of temperature, rescue inhaler should be used before exercise. Order of medication reviewed with patient. Recommended use of a cool mist humidifier in the bedroom. \par \par Problem 2A: Allergies\par -continue Claritin 10 mg QD\par Environmental measures for allergies were encouraged including mattress and pillow cover, air purifier, and environmental controls.\par \par problem 3: prior CTA\par -ruled out pulmonary embolism and any underlying disease\par \par problem 4: cardiac component \par -continue to follow up with cardiologist - repeat cardiopulmonary stress test \par -his recent pulmonary cardiac stress test was normal\par \par problem 5: SERAFIN\par -continue to use Oxy-Aid by [employer] / "Sleep Rite"\par -recommended to try Boron supplements 6 mg BID\par -Sleep apnea is associated with adverse clinical consequences which an affect most organ systems. Cardiovascular disease risk includes arrhythmias, atrial fibrillation, hypertension, coronary artery disease, and stroke. Metabolic disorders include diabetes type 2, non-alcoholic fatty liver disease. Mood disorder especially depression; and cognitive decline especially in the elderly. Associations with chronic reflux/Marques’s esophagus some but not all inclusive.\par -Reasons include arousal consistent with hypopnea; respiratory events most prominent in REM sleep or supine position; therefore sleep staging and body position are important for accurate diagnosis and estimation of AHI. \par -Treatment options discussed including CPAP/BiPAP machine, oral appliance, ProVent therapy, Oxy-Aid by [employer], new technologies, or positional sleep.Recommended use of the CPAP machine for moderate (AHI >15), moderate to severe (AHI 15-30) and severe patients (AHI > 30). Recommended weight loss which can reduce AHI especially in weight loss of greater than 5% of BMI. Positional sleep is recommended in those with low AHI, low-moderate BMI, and younger age. For severe sleep apnea, the hypoglossal nerve stimulator was recommended as well. \par \par problem 6: exercise\par -recommended to transition from treadmill/swimming/tread water\par -recommended to use natural protein\par \par problem 7: exercise induced pulmonary hypertension\par -he may qualify for prostacyclin drugs including Uptavi or Orenitram or Tyvaso (#1) (or Ventavis) \par -considering Uptavi (pending cardiopulmonary exercise stress test) / Tyvaso\par -Recommended to have an echocardiogram to evaluate for PAH with Dr. Wilkinson\par \par Problem 8: muscle cramps (improved)\par -continue theraworx \par -continue to use Optimal Electrolytes (pre-exercise)\par -recommended Topricin ointment \par \par problem 9: poor mechanics of breathing\par -Recommended Asael Benedict and Kamar breathing techniques \par -Proper breathing techniques were reviewed with an emphasis of exhalation. Patient instructed to breath in for 1 second and out for four seconds. Patient was encouraged to not talk while walking. \par \par -recommended to hydrate before exercise\par -recommended to use a heart rate monitor while exercising\par -recommended to use a natural protein powder \par \par Problem 10: Health Maintenance/COVID19 Precautions:\par -s/p J&J COVID 19 vaccine \par - Clean your hands often. Wash your hands often with soap and water for at least 20 seconds, especially after blowing your nose, coughing, or sneezing, or having been in a public place.\par - If soap and water are not available, use a hand  that contains at least 60% alcohol.\par - To the extent possible, avoid touching high-touch surfaces in public places - elevator buttons, door handles, handrails, handshaking with people, etc. Use a tissue or your sleeve to cover your hand or finger if you must touch something.\par - Wash your hands after touching surfaces in public places.\par - Avoid touching your face, nose, eyes, etc.\par - Clean and disinfect your home to remove germs: practice routine cleaning of frequently touched surfaces (for example: tables, doorknobs, light switches, handles, desks, toilets, faucets, sinks & cell phones)\par - Avoid crowds, especially in poorly ventilated spaces. Your risk of exposure to respiratory viruses like COVID-19 may increase in crowded, closed-in settings with little air circulation if there are people in the crowd who are sick. All patients are recommended to practice social distancing and stay at least 6 feet away from others.\par - Avoid all non-essential travel including plane trips, and especially avoid embarking on cruise ships.\par -If COVID-19 is spreading in your community, take extra measures to put distance between yourself and other people to further reduce your risk of being exposed to this new virus.\par -Stay home as much as possible.\par - Consider ways of getting food brought to your house through family, social, or commercial networks\par -Be aware that the virus has been known to live in the air up to 3 hours post exposure, cardboard up to 24 hours post exposure, copper up to 4 hours post exposure, steel and plastic up to 2-3 days post exposure. Risk of transmission from these surfaces are affected by many variables.\par Immune Support Recommendations:\par -OTC Vitamin C 500mg BID \par -OTC Quercetin 250-500mg BID \par -OTC Zinc 75-100mg per day \par -OTC Melatonin 1 or 2 mg a night \par -OTC Vitamin D 1-4000mg per day \par -OTC Tonic Water 8oz per day\par Asthma and COVID19:\par You need to make sure your asthma is under control. This often requires the use of inhaled corticosteroids (and sometimes oral corticosteroids). Inhaled corticosteroids do not likely reduce your immune system’s ability to fight infections, but oral corticosteroids may. It is important to use the steps above to protect yourself to limit your exposure to any respiratory virus.\par \par problem 11: health maintenance \par -Recommend SPM, liposomal glutathione/ L-carnitine\par - Recommended inguinal hernia evaluation\par -Recommended to take Turmeric\par -Recommended to take Lobelia/Iobelia\par -recommended to try optimal electrolytes \par -recommended yearly flu shot after October 15, refused \par -recommended strep pneumonia vaccines: Prevnar-13 vaccine, followed by Pneumo vaccine 23 one year following (completed)\par -recommended early intervention for URIs\par -recommended regular osteoporosis evaluations\par -recommended early dermatological evaluations\par -recommended after the age of 50 to the age of 70, colonoscopy every 5 years \par - recommend Vitamin D supplements \par - recommend probiotic\par  \par F/U in 4 months \par He is encouraged to call with any changes, concerns, or questions. \par

## 2021-10-12 NOTE — PHYSICAL EXAM
[No Acute Distress] : no acute distress [Normal Oropharynx] : normal oropharynx [II] : Mallampati Class: II [Normal Appearance] : normal appearance [No Neck Mass] : no neck mass [Normal Rate/Rhythm] : normal rate/rhythm [Normal S1, S2] : normal s1, s2 [No Murmurs] : no murmurs [No Resp Distress] : no resp distress [Clear to Auscultation Bilaterally] : clear to auscultation bilaterally [Benign] : benign [No Abnormalities] : no abnormalities [Normal Gait] : normal gait [No Clubbing] : no clubbing [No Cyanosis] : no cyanosis [No Edema] : no edema [FROM] : FROM [Normal Color/ Pigmentation] : normal color/ pigmentation [No Focal Deficits] : no focal deficits [Oriented x3] : oriented x3 [Normal Affect] : normal affect [TextBox_68] : I:E 1:3; Clear

## 2022-01-14 ENCOUNTER — NON-APPOINTMENT (OUTPATIENT)
Age: 77
End: 2022-01-14

## 2022-01-14 RX ORDER — ALBUTEROL SULFATE 90 UG/1
108 (90 BASE) INHALANT RESPIRATORY (INHALATION)
Qty: 1 | Refills: 1 | Status: ACTIVE | COMMUNITY
Start: 2022-01-14 | End: 1900-01-01

## 2022-01-18 ENCOUNTER — APPOINTMENT (OUTPATIENT)
Dept: PULMONOLOGY | Facility: CLINIC | Age: 77
End: 2022-01-18

## 2022-01-19 ENCOUNTER — TRANSCRIPTION ENCOUNTER (OUTPATIENT)
Age: 77
End: 2022-01-19

## 2022-02-14 ENCOUNTER — TRANSCRIPTION ENCOUNTER (OUTPATIENT)
Age: 77
End: 2022-02-14

## 2022-02-16 ENCOUNTER — APPOINTMENT (OUTPATIENT)
Dept: PULMONOLOGY | Facility: CLINIC | Age: 77
End: 2022-02-16
Payer: MEDICARE

## 2022-02-16 VITALS
HEIGHT: 66 IN | BODY MASS INDEX: 25.07 KG/M2 | DIASTOLIC BLOOD PRESSURE: 76 MMHG | TEMPERATURE: 97.6 F | WEIGHT: 156 LBS | SYSTOLIC BLOOD PRESSURE: 124 MMHG | RESPIRATION RATE: 16 BRPM | HEART RATE: 76 BPM | OXYGEN SATURATION: 98 %

## 2022-02-16 PROCEDURE — 99214 OFFICE O/P EST MOD 30 MIN: CPT | Mod: 25

## 2022-02-16 PROCEDURE — 95012 NITRIC OXIDE EXP GAS DETER: CPT

## 2022-02-16 PROCEDURE — 94729 DIFFUSING CAPACITY: CPT

## 2022-02-16 PROCEDURE — 94010 BREATHING CAPACITY TEST: CPT

## 2022-02-16 PROCEDURE — 94727 GAS DIL/WSHOT DETER LNG VOL: CPT

## 2022-02-16 PROCEDURE — 94618 PULMONARY STRESS TESTING: CPT

## 2022-02-16 NOTE — PROCEDURE
[FreeTextEntry1] : Full PFT revealed normal flows, with a FEV1 of 3.03 L, which is 112% of predicted, normal lung volumes, and a diffusion of 17.1 , which is 102 % of predicted, with a normal flow volume loop \par  \par Feno was 22; a normal value being less than 25. Fractional exhaled nitric oxide (FENO) is regarded as a simple, noninvasive method for assessing eosinophilic airway inflammation. Produced by a variety of cells within the lung, nitric oxide (NO) concentrations are generally low in healthy individuals. However, high concentrations of NO appear to be involved in nonspecific host defense mechanisms and chronic inflammatory  diseases such as asthma. The American Thoracic Society (ATS) therefore recommended using FENO to aid in the diagnosis and monitoring of eosinophilic airway inflammation and asthma, and for identifying steroid responsive individuals whose chronic respiratory symptoms may be caused by airway inflammation \par \par \par Card Cath Report (Sep.27.2021) revealed: mild HTN category, mean pulm artery pressure 22, PVR 1.49 CRUZ \par \par 6 minute walk test reveals a low saturation of 98% with no evidence of dyspnea or fatigue; walked 521.6 meters\par  \par

## 2022-02-16 NOTE — REASON FOR VISIT
[Follow-Up] : a follow-up visit [FreeTextEntry1] : allergic rhinitis, SERAFIN,exercise induced  PAH, RAD, and SOB

## 2022-02-16 NOTE — HISTORY OF PRESENT ILLNESS
[FreeTextEntry1] : Mr. Knapp is a 76 year old male with a history of allergic rhinitis, SERAFIN, PAH, RAD, and SOB presenting to the office today for a follow up visit. His chief complaint is \par - he has been exercising daily \par - he has been using the elliptical machine \par - he notes he has been taking \par - he notes he was getting leg cramps at night \par - bowels are regular \par - he notes he has been urinating frequently\par - no itchy eyes / ears \par - no heart burn / reflux \par - weight has been stable \par - he notes he sleeps for 3-4 hours and then his sleep is interrupted. \par - he had a echo done and nothing significant was found \par - he would like to re-try Adcirca \par patient denies any headaches, nausea, vomiting, fever, chills, sweats, chest pain, chest pressure, palpitations, coughing, wheezing, fatigue, diarrhea, constipation, dysphagia, myalgias, dizziness, leg swelling, leg pain, itchy eyes, itchy ears, heartburn, reflux or sour taste in the mouth\par

## 2022-02-16 NOTE — ADDENDUM
[FreeTextEntry1] : Documented by Ashley Toro acting as a scribe for Dr. Pato Knight on (02/16/2022).\par \par All medical record entries made by the Scribe were at my, Dr. Pato Knight's, direction and personally dictated by me on (02/16/2022). I have reviewed the chart and agree that the record accurately reflects my personal performance of the history, physical exam, assessment and plan. I have also personally directed, reviewed, and agree with the discharge instructions.\par

## 2022-02-16 NOTE — ASSESSMENT
[FreeTextEntry1] : Mr. Knapp is a 76 year old male who has a h/o AF/flutter, macular pucker, allergy, low vitamin D, RAD, PAH, and SERAFIN who is now mildly compromised from a pulmonary perspective. His number one issue is TUCKER and fatigue with exercise (still)- ?good results with Adcirca \par \par His progressive SOB is currently stable and is multifactorial due to:\par -exercise\par -PAH\par -RADS\par -?cardiac disease\par -poor mechanics of breathing\par \par problem 1: history of PAH, exercise induced (RHC @ rest - No PAH 9/2021)\par -previously failed:, Revatio, Adcirca, Calcium channel blockers, and Tracleer; retry Adcirca 20 mg q event \par -considering Uptavi (IP Antagonist) / Tyvaso pre exercise (or Ventavis) \par \par -Disorder of the pulmonary arteries, important to distinguish the difference between pulmonary arterial hypertension which is idiopathic to secondary pulmonary hypertension which is related to heart disease being diastolic dysfunction or congestive heart failure. Diagnostics include an initial echocardiogram evaluating the pulmonary artery pressures, if this is abnormal, to proceed with a VQ scan as well as a CTPA and an eventual right heart catheterization (No medication can be prescribed until the right heart catheterization). If present, the evaluation will include rheumatological blood testing, HIV testing, and potential evaluation for cirrhosis. Drug classes include PED5 (Revatio, Adcirca) ETRA (Tracleer, Macitentan, Letairis), Soluble guanylate cyclase (Adempas) Prostacyclins (Uptravi, Tyavso, Ventavis, Remodulin, or Orenitram derivatives). \par \par problem 2: RADS; \par -s/p trial of new triple MDI \par -failed Utibron,Bevespi, Seebri, Spiriva, Incruse, Anoro, Duaklir, Breztri\par -continue to use Ventolin or Xopenex  PRN and before exercise, PRN\par \par -Inhaler technique reviewed as well as oral hygiene techniques reviewed with patient. Avoidance of cold air, extremes of temperature, rescue inhaler should be used before exercise. Order of medication reviewed with patient. Recommended use of a cool mist humidifier in the bedroom. \par \par Problem 2A: Allergies\par -continue Claritin 10 mg QD\par Environmental measures for allergies were encouraged including mattress and pillow cover, air purifier, and environmental controls.\par \par problem 3: prior CTA\par -ruled out pulmonary embolism and any underlying disease\par \par problem 4: cardiac component \par -continue to follow up with cardiologist - repeat cardiopulmonary stress test \par -his recent pulmonary cardiac stress test was normal\par \par problem 5: SERAFIN\par -continue to use Oxy-Aid by [facility] / "Sleep Rite"\par -recommended to try Boron supplements 6 mg BID\par -Sleep apnea is associated with adverse clinical consequences which an affect most organ systems. Cardiovascular disease risk includes arrhythmias, atrial fibrillation, hypertension, coronary artery disease, and stroke. Metabolic disorders include diabetes type 2, non-alcoholic fatty liver disease. Mood disorder especially depression; and cognitive decline especially in the elderly. Associations with chronic reflux/Marques’s esophagus some but not all inclusive.\par -Reasons include arousal consistent with hypopnea; respiratory events most prominent in REM sleep or supine position; therefore sleep staging and body position are important for accurate diagnosis and estimation of AHI. \par -Treatment options discussed including CPAP/BiPAP machine, oral appliance, ProVent therapy, Oxy-Aid by [employer], new technologies, or positional sleep.Recommended use of the CPAP machine for moderate (AHI >15), moderate to severe (AHI 15-30) and severe patients (AHI > 30). Recommended weight loss which can reduce AHI especially in weight loss of greater than 5% of BMI. Positional sleep is recommended in those with low AHI, low-moderate BMI, and younger age. For severe sleep apnea, the hypoglossal nerve stimulator was recommended as well. \par \par problem 6: exercise\par - recommended myovive \par -recommended to transition from treadmill/swimming/tread water\par -recommended to use natural protein\par \par problem 7: exercise induced pulmonary hypertension\par -he may qualify for prostacyclin drugs including Uptavi or Orenitram or Tyvaso (#1) (or Ventavis) \par -considering Uptavi (pending cardiopulmonary exercise stress test) / Tyvaso\par -Recommended to have an echocardiogram to evaluate for PAH with Dr. Wilkinson\par \par Problem 8: muscle cramps (improved)\par -continue theraworx \par -continue to use Optimal Electrolytes (pre-exercise)\par -recommended Topricin ointment \par \par problem 9: poor mechanics of breathing\par -Recommended Wim Hof and Buteyko breathing techniques \par -Proper breathing techniques were reviewed with an emphasis of exhalation. Patient instructed to breath in for 1 second and out for four seconds. Patient was encouraged to not talk while walking. \par \par -recommended to hydrate before exercise\par -recommended to use a heart rate monitor while exercising\par -recommended to use a natural protein powder \par \par Problem 10: Health Maintenance/COVID19 Precautions:\par -s/p J&J COVID 19 vaccine \par - Clean your hands often. Wash your hands often with soap and water for at least 20 seconds, especially after blowing your nose, coughing, or sneezing, or having been in a public place.\par - If soap and water are not available, use a hand  that contains at least 60% alcohol.\par - To the extent possible, avoid touching high-touch surfaces in public places - elevator buttons, door handles, handrails, handshaking with people, etc. Use a tissue or your sleeve to cover your hand or finger if you must touch something.\par - Wash your hands after touching surfaces in public places.\par - Avoid touching your face, nose, eyes, etc.\par - Clean and disinfect your home to remove germs: practice routine cleaning of frequently touched surfaces (for example: tables, doorknobs, light switches, handles, desks, toilets, faucets, sinks & cell phones)\par - Avoid crowds, especially in poorly ventilated spaces. Your risk of exposure to respiratory viruses like COVID-19 may increase in crowded, closed-in settings with little air circulation if there are people in the crowd who are sick. All patients are recommended to practice social distancing and stay at least 6 feet away from others.\par - Avoid all non-essential travel including plane trips, and especially avoid embarking on cruise ships.\par -If COVID-19 is spreading in your community, take extra measures to put distance between yourself and other people to further reduce your risk of being exposed to this new virus.\par -Stay home as much as possible.\par - Consider ways of getting food brought to your house through family, social, or commercial networks\par -Be aware that the virus has been known to live in the air up to 3 hours post exposure, cardboard up to 24 hours post exposure, copper up to 4 hours post exposure, steel and plastic up to 2-3 days post exposure. Risk of transmission from these surfaces are affected by many variables.\par Immune Support Recommendations:\par -OTC Vitamin C 500mg BID \par -OTC Quercetin 250-500mg BID \par -OTC Zinc 75-100mg per day \par -OTC Melatonin 1 or 2 mg a night \par -OTC Vitamin D 1-4000mg per day \par -OTC Tonic Water 8oz per day\par Asthma and COVID19:\par You need to make sure your asthma is under control. This often requires the use of inhaled corticosteroids (and sometimes oral corticosteroids). Inhaled corticosteroids do not likely reduce your immune system’s ability to fight infections, but oral corticosteroids may. It is important to use the steps above to protect yourself to limit your exposure to any respiratory virus.\par \par problem 11: health maintenance \par -Recommend SPM, liposomal glutathione/ L-carnitine\par - Recommended inguinal hernia evaluation\par -Recommended to take Turmeric\par -Recommended to take Lobelia/Iobelia\par -recommended to try optimal electrolytes \par -recommended yearly flu shot after October 15, refused \par -recommended strep pneumonia vaccines: Prevnar-13 vaccine, followed by Pneumo vaccine 23 one year following (completed)\par -recommended early intervention for URIs\par -recommended regular osteoporosis evaluations\par -recommended early dermatological evaluations\par -recommended after the age of 50 to the age of 70, colonoscopy every 5 years \par - recommend Vitamin D supplements \par - recommend probiotic\par  \par F/U in 4 months \par He is encouraged to call with any changes, concerns, or questions. \par

## 2022-06-22 ENCOUNTER — APPOINTMENT (OUTPATIENT)
Dept: PULMONOLOGY | Facility: CLINIC | Age: 77
End: 2022-06-22
Payer: MEDICARE

## 2022-06-22 ENCOUNTER — NON-APPOINTMENT (OUTPATIENT)
Age: 77
End: 2022-06-22

## 2022-06-22 VITALS
HEART RATE: 78 BPM | BODY MASS INDEX: 24.91 KG/M2 | HEIGHT: 66 IN | OXYGEN SATURATION: 98 % | WEIGHT: 155 LBS | SYSTOLIC BLOOD PRESSURE: 122 MMHG | RESPIRATION RATE: 17 BRPM | DIASTOLIC BLOOD PRESSURE: 80 MMHG | TEMPERATURE: 96.4 F

## 2022-06-22 DIAGNOSIS — I27.20 PULMONARY HYPERTENSION, UNSPECIFIED: ICD-10-CM

## 2022-06-22 PROCEDURE — 94010 BREATHING CAPACITY TEST: CPT

## 2022-06-22 PROCEDURE — 95012 NITRIC OXIDE EXP GAS DETER: CPT

## 2022-06-22 PROCEDURE — 99214 OFFICE O/P EST MOD 30 MIN: CPT | Mod: 25

## 2022-06-22 RX ORDER — KETOCONAZOLE 20 MG/G
2 CREAM TOPICAL
Qty: 60 | Refills: 0 | Status: DISCONTINUED | COMMUNITY
Start: 2022-03-16

## 2022-06-22 RX ORDER — HYDROCORTISONE 25 MG/G
2.5 CREAM TOPICAL
Qty: 28 | Refills: 0 | Status: DISCONTINUED | COMMUNITY
Start: 2022-03-16

## 2022-06-22 NOTE — ADDENDUM
[FreeTextEntry1] : Documented by Jd Barfield acting as a scribe for Dr. Pato Knight on 06/22/2022.\par \par All medical record entries made by the Scribe were at my, Dr. Pato Knight's, direction and personally dictated by me on 06/22/2022. I have reviewed the chart and agree that the record accurately reflects my personal performance of the history, physical exam, assessment and plan. I have also personally directed, reviewed, and agree with the discharge instructions.

## 2022-06-22 NOTE — PHYSICAL EXAM
[No Acute Distress] : no acute distress [Normal Oropharynx] : normal oropharynx [II] : Mallampati Class: II [Normal Appearance] : normal appearance [No Neck Mass] : no neck mass [Normal S1, S2] : normal s1, s2 [No Murmurs] : no murmurs [No Resp Distress] : no resp distress [Clear to Auscultation Bilaterally] : clear to auscultation bilaterally [No Abnormalities] : no abnormalities [Benign] : benign [Normal Gait] : normal gait [No Clubbing] : no clubbing [No Cyanosis] : no cyanosis [No Edema] : no edema [FROM] : FROM [Normal Color/ Pigmentation] : normal color/ pigmentation [No Focal Deficits] : no focal deficits [Oriented x3] : oriented x3 [Normal Affect] : normal affect [TextBox_54] : irregular heart beat [TextBox_68] : I:E 1:3; Clear

## 2022-06-22 NOTE — ASSESSMENT
[FreeTextEntry1] : Mr. Knapp is a 76 year old male who has a h/o AF/flutter, macular pucker, allergy, low vitamin D, RAD, PAH, and SERAFIN who is now mildly compromised from a pulmonary perspective. His number one issue is TUCKER and fatigue with exercise (still)- ?good results with Adcirca - awaiting new script\par \par His progressive SOB is currently stable and is multifactorial due to:\par -exercise\par -PAH\par -RADS\par -?cardiac disease\par -poor mechanics of breathing\par \par problem 1: history of PAH, exercise induced (RHC @ rest - No PAH 9/2021)\par -previously failed:, Revatio, Adcirca, Calcium channel blockers, and Tracleer; retry Adcirca 20 mg q event \par -considering Uptavi (IP Antagonist) / Tyvaso pre exercise (or Ventavis) \par \par -Disorder of the pulmonary arteries, important to distinguish the difference between pulmonary arterial hypertension which is idiopathic to secondary pulmonary hypertension which is related to heart disease being diastolic dysfunction or congestive heart failure. Diagnostics include an initial echocardiogram evaluating the pulmonary artery pressures, if this is abnormal, to proceed with a VQ scan as well as a CTPA and an eventual right heart catheterization (No medication can be prescribed until the right heart catheterization). If present, the evaluation will include rheumatological blood testing, HIV testing, and potential evaluation for cirrhosis. Drug classes include PED5 (Revatio, Adcirca) ETRA (Tracleer, Macitentan, Letairis), Soluble guanylate cyclase (Adempas) Prostacyclins (Uptravi, Tyavso, Ventavis, Remodulin, or Orenitram derivatives). \par \par problem 2: RADS; \par -s/p trial of new triple MDI \par -failed Utibron,Bevespi, Seebri, Spiriva, Incruse, Anoro, Duaklir, Breztri\par -continue to use Ventolin or Xopenex  PRN and before exercise, PRN\par \par -Inhaler technique reviewed as well as oral hygiene techniques reviewed with patient. Avoidance of cold air, extremes of temperature, rescue inhaler should be used before exercise. Order of medication reviewed with patient. Recommended use of a cool mist humidifier in the bedroom. \par \par Problem 2A: Allergies\par -continue Claritin 10 mg QD\par Environmental measures for allergies were encouraged including mattress and pillow cover, air purifier, and environmental controls.\par \par problem 3: prior CTA\par -ruled out pulmonary embolism and any underlying disease\par \par problem 4: cardiac component (Chinitz)\par -continue to follow up with cardiologist - repeat cardiopulmonary stress test \par - pulmonary cardiac stress test was normal\par \par problem 5: SERAFIN\par -continue to use Oxy-Aid by [employer] / "Sleep Rite"\par -recommended to try Boron supplements 6 mg BID\par -Sleep apnea is associated with adverse clinical consequences which an affect most organ systems. Cardiovascular disease risk includes arrhythmias, atrial fibrillation, hypertension, coronary artery disease, and stroke. Metabolic disorders include diabetes type 2, non-alcoholic fatty liver disease. Mood disorder especially depression; and cognitive decline especially in the elderly. Associations with chronic reflux/Marques’s esophagus some but not all inclusive.\par -Reasons include arousal consistent with hypopnea; respiratory events most prominent in REM sleep or supine position; therefore sleep staging and body position are important for accurate diagnosis and estimation of AHI. \par -Treatment options discussed including CPAP/BiPAP machine, oral appliance, ProVent therapy, Oxy-Aid by [employer], new technologies, or positional sleep.Recommended use of the CPAP machine for moderate (AHI >15), moderate to severe (AHI 15-30) and severe patients (AHI > 30). Recommended weight loss which can reduce AHI especially in weight loss of greater than 5% of BMI. Positional sleep is recommended in those with low AHI, low-moderate BMI, and younger age. For severe sleep apnea, the hypoglossal nerve stimulator was recommended as well. \par \par problem 6: exercise\par -s/p Dion Vive\par -recommended Qnol trial\par -recommended to transition from treadmill/swimming/tread water\par -recommended to use natural protein\par \par problem 7: exercise induced pulmonary hypertension\par -he may qualify for prostacyclin drugs including Uptavi or Orenitram or Tyvaso (#1) (or Ventavis) \par -considering Uptavi (pending cardiopulmonary exercise stress test) / Tyvaso\par -Recommended to have an echocardiogram to evaluate for PAH with Dr. Wilkinson/Danyel\par \par Problem 8: muscle cramps (improved)\par -continue theraworx \par -continue to use Optimal Electrolytes (pre-exercise)\par -recommended Topricin ointment \par \par problem 9: poor mechanics of breathing\par -Recommended Wim Hof and Buteyko breathing techniques \par -Proper breathing techniques were reviewed with an emphasis of exhalation. Patient instructed to breath in for 1 second and out for four seconds. Patient was encouraged to not talk while walking. \par \par -recommended to hydrate before exercise\par -recommended to use a heart rate monitor while exercising\par -recommended to use a natural protein powder \par \par Problem 10: Health Maintenance/COVID19 Precautions:\par -s/p J&J COVID 19 vaccine \par - Clean your hands often. Wash your hands often with soap and water for at least 20 seconds, especially after blowing your nose, coughing, or sneezing, or having been in a public place.\par - If soap and water are not available, use a hand  that contains at least 60% alcohol.\par - To the extent possible, avoid touching high-touch surfaces in public places - elevator buttons, door handles, handrails, handshaking with people, etc. Use a tissue or your sleeve to cover your hand or finger if you must touch something.\par - Wash your hands after touching surfaces in public places.\par - Avoid touching your face, nose, eyes, etc.\par - Clean and disinfect your home to remove germs: practice routine cleaning of frequently touched surfaces (for example: tables, doorknobs, light switches, handles, desks, toilets, faucets, sinks & cell phones)\par - Avoid crowds, especially in poorly ventilated spaces. Your risk of exposure to respiratory viruses like COVID-19 may increase in crowded, closed-in settings with little air circulation if there are people in the crowd who are sick. All patients are recommended to practice social distancing and stay at least 6 feet away from others.\par - Avoid all non-essential travel including plane trips, and especially avoid embarking on cruise ships.\par -If COVID-19 is spreading in your community, take extra measures to put distance between yourself and other people to further reduce your risk of being exposed to this new virus.\par -Stay home as much as possible.\par - Consider ways of getting food brought to your house through family, social, or commercial networks\par -Be aware that the virus has been known to live in the air up to 3 hours post exposure, cardboard up to 24 hours post exposure, copper up to 4 hours post exposure, steel and plastic up to 2-3 days post exposure. Risk of transmission from these surfaces are affected by many variables.\par Immune Support Recommendations:\par -OTC Vitamin C 500mg BID \par -OTC Quercetin 250-500mg BID \par -OTC Zinc 75-100mg per day \par -OTC Melatonin 1 or 2 mg a night \par -OTC Vitamin D 1-4000mg per day \par -OTC Tonic Water 8oz per day\par Asthma and COVID19:\par You need to make sure your asthma is under control. This often requires the use of inhaled corticosteroids (and sometimes oral corticosteroids). Inhaled corticosteroids do not likely reduce your immune system’s ability to fight infections, but oral corticosteroids may. It is important to use the steps above to protect yourself to limit your exposure to any respiratory virus.\par \par problem 11: health maintenance \par -Recommend SPM, liposomal glutathione/ L-carnitine\par - Recommended inguinal hernia evaluation\par -Recommended to take Turmeric\par -Recommended to take Lobelia/Iobelia\par -recommended to try optimal electrolytes \par -recommended yearly flu shot after October 15, refused \par -recommended strep pneumonia vaccines: Prevnar-13 vaccine, followed by Pneumo vaccine 23 one year following (completed)\par -recommended early intervention for URIs\par -recommended regular osteoporosis evaluations\par -recommended early dermatological evaluations\par -recommended after the age of 50 to the age of 70, colonoscopy every 5 years \par - recommend Vitamin D supplements \par - recommend probiotic\par  \par F/U in 4 months \par He is encouraged to call with any changes, concerns, or questions. \par

## 2022-06-22 NOTE — PROCEDURE
[FreeTextEntry1] : Feno was 18; a normal value being less than 25. Fractional exhaled nitric oxide (FENO) is regarded as a simple, noninvasive method for assessing eosinophilic airway inflammation. Produced by a variety of cells within the lung, nitric oxide (NO) concentrations are generally low in healthy individuals. However, high concentrations of NO appear to be involved in nonspecific host defense mechanisms and chronic inflammatory  diseases such as asthma. The American Thoracic Society (ATS) therefore recommended using FENO to aid in the diagnosis and monitoring of eosinophilic airway inflammation and asthma, and for identifying steroid responsive individuals whose chronic respiratory symptoms may be caused by airway inflammation \par \par PFT revealed normal flows, with a FEV1 of 3.41L, which is 133% of predicted, with a normal flow volume loop

## 2022-06-22 NOTE — HISTORY OF PRESENT ILLNESS
[FreeTextEntry1] : Mr. Knapp is a 76 year old male with a history of allergic rhinitis, SERAFIN, PAH, RAD, and SOB presenting to the office today for a follow up visit. 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 \par -he notes swelling near testicles\par -he notes he has tried Dion Vive which has not helped him\par -he notes that Vitamin B12 makes cuts heal slowly\par -he notes that his energy levels are the same\par -he notes that he has a damaged left eye (macular pucker unchanged)\par -he notes that his depth perception is poor\par -he denies hoarseness \par -he notes his bowels are regular \par -he notes that he is sleeping well \par -he denies snoring \par -he notes swimming and biking for exercise\par -he notes his weight is stable\par -he notes his palpitations are unchanged\par -he notes that he gets fatigued on exertion\par \par -patient denies any headaches, nausea, vomiting, fever, chills, sweats, chest pain, chest pressure, coughing, wheezing, fatigue, diarrhea, constipation, dysphagia, myalgias, dizziness, leg swelling, leg pain, itchy eyes, itchy ears, heartburn, reflux or sour taste in the mouth

## 2022-06-29 RX ORDER — TADALAFIL 20 MG/1
20 TABLET ORAL
Qty: 30 | Refills: 5 | Status: ACTIVE | COMMUNITY
Start: 2022-06-22 | End: 1900-01-01

## 2022-10-25 ENCOUNTER — NON-APPOINTMENT (OUTPATIENT)
Age: 77
End: 2022-10-25

## 2022-10-25 ENCOUNTER — APPOINTMENT (OUTPATIENT)
Dept: PULMONOLOGY | Facility: CLINIC | Age: 77
End: 2022-10-25

## 2022-10-25 VITALS
WEIGHT: 155 LBS | HEART RATE: 117 BPM | DIASTOLIC BLOOD PRESSURE: 76 MMHG | HEIGHT: 68 IN | TEMPERATURE: 98.2 F | BODY MASS INDEX: 23.49 KG/M2 | SYSTOLIC BLOOD PRESSURE: 120 MMHG | RESPIRATION RATE: 16 BRPM | OXYGEN SATURATION: 97 %

## 2022-10-25 DIAGNOSIS — Z71.89 OTHER SPECIFIED COUNSELING: ICD-10-CM

## 2022-10-25 PROCEDURE — 95012 NITRIC OXIDE EXP GAS DETER: CPT

## 2022-10-25 PROCEDURE — 99214 OFFICE O/P EST MOD 30 MIN: CPT | Mod: 25

## 2022-10-25 PROCEDURE — ZZZZZ: CPT

## 2022-10-25 PROCEDURE — 94727 GAS DIL/WSHOT DETER LNG VOL: CPT

## 2022-10-25 PROCEDURE — 94729 DIFFUSING CAPACITY: CPT

## 2022-10-25 PROCEDURE — 94010 BREATHING CAPACITY TEST: CPT

## 2022-10-25 RX ORDER — PREDNISOLONE ACETATE 10 MG/ML
1 SUSPENSION/ DROPS OPHTHALMIC
Qty: 10 | Refills: 0 | Status: ACTIVE | COMMUNITY
Start: 2022-09-29

## 2022-10-25 RX ORDER — BETAMETHASONE DIPROPIONATE 0.5 MG/G
0.05 CREAM, AUGMENTED TOPICAL
Qty: 50 | Refills: 0 | Status: ACTIVE | COMMUNITY
Start: 2022-07-27

## 2022-10-25 RX ORDER — OFLOXACIN 3 MG/ML
0.3 SOLUTION/ DROPS OPHTHALMIC
Qty: 5 | Refills: 0 | Status: ACTIVE | COMMUNITY
Start: 2022-09-29

## 2022-10-25 RX ORDER — TREPROSTINIL 1.74 MG/2.9ML
0.6 INHALANT ORAL
Qty: 1 | Refills: 11 | Status: DISCONTINUED | COMMUNITY
Start: 2021-06-24 | End: 2022-10-25

## 2022-10-25 RX ORDER — OXYCODONE AND ACETAMINOPHEN 5; 325 MG/1; MG/1
5-325 TABLET ORAL
Qty: 6 | Refills: 0 | Status: DISCONTINUED | COMMUNITY
Start: 2020-07-28 | End: 2022-10-25

## 2022-10-25 NOTE — ADDENDUM
[FreeTextEntry1] : Documented by Jd Barfield acting as a scribe for Dr. Pato Knight on 10/25/2022.\par \par All medical record entries made by the Scribe were at my, Dr. Pato Knight's, direction and personally dictated by me on 10/25/2022. I have reviewed the chart and agree that the record accurately reflects my personal performance of the history, physical exam, assessment and plan. I have also personally directed, reviewed, and agree with the discharge instructions.

## 2022-10-25 NOTE — HISTORY OF PRESENT ILLNESS
[FreeTextEntry1] : Mr. Knapp is a 76 year old male with a history of allergic rhinitis, SERAFIN, PAH, RAD, and SOB presenting to the office today for a follow up visit. His chief complaint is \par -he notes he is going to have surgery on left eye for macular pucker\par -he notes that his energy levels are good\par -he notes feeling rested when he wakes up \par -he notes that recently he had a cough, rhinitis (took Xyzal, improved)\par -he notes his weight is stable (155 lbs)\par -he notes that his appetite is good \par -he notes getting 4-5 hours continuous and then interrupted sleep \par -he notes exercising (swimming, biking)\par \par -patient denies any headaches, nausea, vomiting, fever, chills, sweats, chest pain, chest pressure, palpitations, coughing, wheezing, fatigue, diarrhea, constipation, dysphagia, myalgias, dizziness, leg swelling, leg pain, itchy eyes, itchy ears, heartburn, reflux or sour taste in the mouth

## 2022-10-25 NOTE — PROCEDURE
[FreeTextEntry1] : Full PFT revealed normal flows, with a FEV1 of 2.75L, which is 101% of predicted, normal lung volumes, and a diffusion of 28.0, which is 167% of predicted, with a normal flow volume loop \par \par Feno was 31; a normal value being less than 25. Fractional exhaled nitric oxide (FENO) is regarded as a simple, noninvasive method for assessing eosinophilic airway inflammation. Produced by a variety of cells within the lung, nitric oxide (NO) concentrations are generally low in healthy individuals. However, high concentrations of NO appear to be involved in nonspecific host defense mechanisms and chronic inflammatory  diseases such as asthma. The American Thoracic Society (ATS) therefore recommended using FENO to aid in the diagnosis and monitoring of eosinophilic airway inflammation and asthma, and for identifying steroid responsive individuals whose chronic respiratory symptoms may be caused by airway inflammation

## 2022-10-25 NOTE — PHYSICAL EXAM
[Normal Oropharynx] : normal oropharynx [No Acute Distress] : no acute distress [II] : Mallampati Class: II [Normal Appearance] : normal appearance [No Neck Mass] : no neck mass [Normal S1, S2] : normal s1, s2 [No Murmurs] : no murmurs [No Resp Distress] : no resp distress [Clear to Auscultation Bilaterally] : clear to auscultation bilaterally [No Abnormalities] : no abnormalities [Benign] : benign [Normal Gait] : normal gait [No Clubbing] : no clubbing [No Cyanosis] : no cyanosis [No Edema] : no edema [FROM] : FROM [Normal Color/ Pigmentation] : normal color/ pigmentation [No Focal Deficits] : no focal deficits [Oriented x3] : oriented x3 [Normal Affect] : normal affect [TextBox_68] : I:E 1:3; Clear

## 2022-10-25 NOTE — ASSESSMENT
[FreeTextEntry1] : Mr. Knapp is a 76 year old male who has a h/o AF/flutter, macular pucker, allergy, low vitamin D, RAD, PAH, and SERAFIN who is now mildly compromised from a pulmonary perspective. His number one issue is macular pucker, orthopedic\par \par His progressive SOB is currently stable and is multifactorial due to:\par -exercise\par -PAH\par -RADS\par -?cardiac disease\par -poor mechanics of breathing\par \par problem 1: history of PAH, exercise induced (RHC @ rest - No PAH 9/2021)\par -previously failed:, Revatio, Adcirca, Calcium channel blockers, and Tracleer; on Adcirca 20 mg q event (Tadalafil)\par -considering Uptavi (IP Antagonist) / Tyvaso pre exercise (or Ventavis) \par \par -Disorder of the pulmonary arteries, important to distinguish the difference between pulmonary arterial hypertension which is idiopathic to secondary pulmonary hypertension which is related to heart disease being diastolic dysfunction or congestive heart failure. Diagnostics include an initial echocardiogram evaluating the pulmonary artery pressures, if this is abnormal, to proceed with a VQ scan as well as a CTPA and an eventual right heart catheterization (No medication can be prescribed until the right heart catheterization). If present, the evaluation will include rheumatological blood testing, HIV testing, and potential evaluation for cirrhosis. Drug classes include PED5 (Revatio, Adcirca) ETRA (Tracleer, Macitentan, Letairis), Soluble guanylate cyclase (Adempas) Prostacyclins (Uptravi, Tyavso, Ventavis, Remodulin, or Orenitram derivatives). \par \par problem 2: RADS; \par -s/p trial of new triple MDI \par -failed Utibron,Bevespi, Seebri, Spiriva, Incruse, Anoro, Duaklir, Breztri\par -continue to use Ventolin or Xopenex  PRN and before exercise, PRN\par \par -Inhaler technique reviewed as well as oral hygiene techniques reviewed with patient. Avoidance of cold air, extremes of temperature, rescue inhaler should be used before exercise. Order of medication reviewed with patient. Recommended use of a cool mist humidifier in the bedroom. \par \par Problem 2A: Allergies\par -continue Claritin 10 mg QD\par Environmental measures for allergies were encouraged including mattress and pillow cover, air purifier, and environmental controls.\par \par problem 3: prior CTA\par -ruled out pulmonary embolism and any underlying disease\par \par problem 4: cardiac component (Chinitz)\par -continue to follow up with cardiologist - repeat cardiopulmonary stress test \par - pulmonary cardiac stress test was normal\par \par problem 5: SERAFIN\par -continue to use Oxy-Aid by [facility] / "Sleep Rite"\par -recommended to try Boron supplements 6 mg BID\par -Sleep apnea is associated with adverse clinical consequences which an affect most organ systems. Cardiovascular disease risk includes arrhythmias, atrial fibrillation, hypertension, coronary artery disease, and stroke. Metabolic disorders include diabetes type 2, non-alcoholic fatty liver disease. Mood disorder especially depression; and cognitive decline especially in the elderly. Associations with chronic reflux/Marques’s esophagus some but not all inclusive.\par -Reasons include arousal consistent with hypopnea; respiratory events most prominent in REM sleep or supine position; therefore sleep staging and body position are important for accurate diagnosis and estimation of AHI. \par -Treatment options discussed including CPAP/BiPAP machine, oral appliance, ProVent therapy, Oxy-Aid by [employer], new technologies, or positional sleep.Recommended use of the CPAP machine for moderate (AHI >15), moderate to severe (AHI 15-30) and severe patients (AHI > 30). Recommended weight loss which can reduce AHI especially in weight loss of greater than 5% of BMI. Positional sleep is recommended in those with low AHI, low-moderate BMI, and younger age. For severe sleep apnea, the hypoglossal nerve stimulator was recommended as well. \par \par problem 6: exercise (ortho limited)\par -s/p Dion Vive\par -recommended Qnol trial\par -recommended to transition from treadmill/swimming/tread water\par -recommended to use natural protein\par \par problem 7: exercise induced pulmonary hypertension\par -he may qualify for prostacyclin drugs including Uptavi or Orenitram or Tyvaso (#1) (or Ventavis) \par -considering Uptavi (pending cardiopulmonary exercise stress test) / Tyvaso\par -Recommended to have an echocardiogram to evaluate for PAH with Dr. Wilkinson/Danyel\par \par Problem 8: muscle cramps (improved)\par -continue theraworx \par -continue to use Optimal Electrolytes (pre-exercise)\par -recommended Topricin ointment \par \par problem 9: poor mechanics of breathing\par -Recommended Wim Hof and Buteyko breathing techniques \par -Proper breathing techniques were reviewed with an emphasis of exhalation. Patient instructed to breath in for 1 second and out for four seconds. Patient was encouraged to not talk while walking. \par \par -recommended to hydrate before exercise\par -recommended to use a heart rate monitor while exercising\par -recommended to use a natural protein powder \par \par Problem 10: Health Maintenance/COVID19 Precautions:\par -s/p J&J COVID 19 vaccine \par - Clean your hands often. Wash your hands often with soap and water for at least 20 seconds, especially after blowing your nose, coughing, or sneezing, or having been in a public place.\par - If soap and water are not available, use a hand  that contains at least 60% alcohol.\par - To the extent possible, avoid touching high-touch surfaces in public places - elevator buttons, door handles, handrails, handshaking with people, etc. Use a tissue or your sleeve to cover your hand or finger if you must touch something.\par - Wash your hands after touching surfaces in public places.\par - Avoid touching your face, nose, eyes, etc.\par - Clean and disinfect your home to remove germs: practice routine cleaning of frequently touched surfaces (for example: tables, doorknobs, light switches, handles, desks, toilets, faucets, sinks & cell phones)\par - Avoid crowds, especially in poorly ventilated spaces. Your risk of exposure to respiratory viruses like COVID-19 may increase in crowded, closed-in settings with little air circulation if there are people in the crowd who are sick. All patients are recommended to practice social distancing and stay at least 6 feet away from others.\par - Avoid all non-essential travel including plane trips, and especially avoid embarking on cruise ships.\par -If COVID-19 is spreading in your community, take extra measures to put distance between yourself and other people to further reduce your risk of being exposed to this new virus.\par -Stay home as much as possible.\par - Consider ways of getting food brought to your house through family, social, or commercial networks\par -Be aware that the virus has been known to live in the air up to 3 hours post exposure, cardboard up to 24 hours post exposure, copper up to 4 hours post exposure, steel and plastic up to 2-3 days post exposure. Risk of transmission from these surfaces are affected by many variables.\par Immune Support Recommendations:\par -OTC Vitamin C 500mg BID \par -OTC Quercetin 250-500mg BID \par -OTC Zinc 75-100mg per day \par -OTC Melatonin 1 or 2 mg a night \par -OTC Vitamin D 1-4000mg per day \par -OTC Tonic Water 8oz per day\par Asthma and COVID19:\par You need to make sure your asthma is under control. This often requires the use of inhaled corticosteroids (and sometimes oral corticosteroids). Inhaled corticosteroids do not likely reduce your immune system’s ability to fight infections, but oral corticosteroids may. It is important to use the steps above to protect yourself to limit your exposure to any respiratory virus.\par \par problem 11: health maintenance \par -Recommend SPM, liposomal glutathione/ L-carnitine\par - Recommended inguinal hernia evaluation\par -Recommended to take Turmeric\par -Recommended to take Lobelia/Iobelia\par -recommended to try optimal electrolytes \par -recommended yearly flu shot after October 15, refused \par -recommended strep pneumonia vaccines: Prevnar-13 vaccine, followed by Pneumo vaccine 23 one year following (completed)\par -recommended early intervention for URIs\par -recommended regular osteoporosis evaluations\par -recommended early dermatological evaluations\par -recommended after the age of 50 to the age of 70, colonoscopy every 5 years \par - recommend Vitamin D supplements \par - recommend probiotic\par  \par F/U in 4 months \par He is encouraged to call with any changes, concerns, or questions. \par

## 2023-02-14 ENCOUNTER — APPOINTMENT (OUTPATIENT)
Dept: ORTHOPEDIC SURGERY | Facility: CLINIC | Age: 78
End: 2023-02-14
Payer: MEDICARE

## 2023-02-14 VITALS — BODY MASS INDEX: 23.49 KG/M2 | HEIGHT: 68 IN | WEIGHT: 155 LBS

## 2023-02-14 DIAGNOSIS — M17.12 UNILATERAL PRIMARY OSTEOARTHRITIS, LEFT KNEE: ICD-10-CM

## 2023-02-14 PROCEDURE — 99215 OFFICE O/P EST HI 40 MIN: CPT

## 2023-02-14 PROCEDURE — 73564 X-RAY EXAM KNEE 4 OR MORE: CPT | Mod: LT

## 2023-02-14 NOTE — PHYSICAL EXAM
[Left] : left knee [NL (0)] : extension 0 degrees [] : no erythema [TWNoteComboBox7] : flexion 130 degrees

## 2023-02-14 NOTE — ASSESSMENT
[FreeTextEntry1] : 77 year M WITH MODERATE LT KNEE PAIN. PAIN WORSENS WITH STAIRS AND WALKING PROLONGED DISTANCES. PAIN IS AFFECTING ADL AND FUNCTIONAL ACTIVITIES. XRAYS REVIEWED WITH ADVANCED MEDIAL OA. TREATMENT OPTIONS REVIEWED. LT TKA DISCUSSED AT LENGTH. QUESTIONS ANSWERED. \par \par PMHx: HEART DISEASE, H/O PULMONARY FIBROSIS\par NO METAL ALLERGIES\par NO H/O DM\par NO H/O DVT/PE \par \par LT TKA - \par \par DISCUSSED R&B OF TKA. WILL ORDER CT TO EVAL FOR BONE LOSS AND DEFORMITY FOR PRE-OP PLANNING. \par \par The patient was advised of the diagnosis. The natural history of the pathology was  explained in full to the patient in layman's terms. All questions were answered. The risks\par and benefits of surgical and non-surgical treatment alternatives were explained in full the patient. \par \par We discussed my findings and the natural history of their condition. We talked about the details of the proposed surgery and the recovery. We discussed the material risks, possible benefits and alternatives to surgery. The risks include but are not limited to infection, bleeding and possible need for blood transfusion, fracture, bowel blockage, bladder retention or infection, need for reoperation, stiffness and/or limited range of motion, possible damage to nerves and blood vessels, failure of fixation of components, risk of deep vein thromboses and pulmonary embolism, wound healing problems, dislocation, and possible leg length discrepancy. Although incredibly rare, we also discussed the risks of a cardiac event, stroke and even death during, or following, the surgery. We discussed the type of implants the patient will be receiving and the type of fixation that will be used, as well as whether a robot or computer navigation aide will be used. The patient understands they will need medical clearance and will attend a preoperative joint education class. We also discussed the type of anesthesia they will receive, and the risks associated with hospital or rehab length of stay, obesity, diabetes and smoking.\par \par Patient Complains of pain in the affected joint with a level that often reaches greater than a 8/10. The Pain has been progressively worsening of his/her treatment coarse. The pain has interfered with their ADLs and worsens with weight bearing. On exam they often have episodes of swelling/effusion with limited ROM. Pain worsens with ROM passive and active and I can palpate crepitus. \par X- rays were reviewed with the patient and they show joint space narrowing, subchondral sclerosis, osteophyte formation, and subchondral cysts.\par After a period of more than 12 weeks physical therapy or exercise program done with me or another treating physician they have continued pain. The patient has failed a trial of NSAID medication or pain relieves if they were unable to tolerate NSAID medications as well as a series of injection, steroid or Hyaluronic Acid. After a long discussion with the patient both the patient and I have decided we have exhausted all forms of less radical treatments and they would like to proceed with Total Joint Replacement.

## 2023-02-14 NOTE — HISTORY OF PRESENT ILLNESS
[Gradual] : gradual [9] : 9 [8] : 8 [Dull/Aching] : dull/aching [Localized] : localized [Constant] : constant [Household chores] : household chores [Leisure] : leisure [Rest] : rest [Sitting] : sitting [Standing] : standing [Walking] : walking [Stairs] : stairs [de-identified] : 2/14/23 This is a 78 y/o M here for left knee eval. Pt received gel injections in the past but now would like to discuss sx. No prior sx or injury to the right knee. No n/t.  [] : Post Surgical Visit: no [FreeTextEntry1] : RT knee [FreeTextEntry5] : No injury [de-identified] : Dr. Meza [de-identified] : none

## 2023-02-17 ENCOUNTER — RESULT REVIEW (OUTPATIENT)
Age: 78
End: 2023-02-17

## 2023-02-28 ENCOUNTER — NON-APPOINTMENT (OUTPATIENT)
Age: 78
End: 2023-02-28

## 2023-02-28 ENCOUNTER — APPOINTMENT (OUTPATIENT)
Dept: PULMONOLOGY | Facility: CLINIC | Age: 78
End: 2023-02-28
Payer: MEDICARE

## 2023-02-28 VITALS
OXYGEN SATURATION: 98 % | WEIGHT: 155 LBS | SYSTOLIC BLOOD PRESSURE: 120 MMHG | HEART RATE: 78 BPM | RESPIRATION RATE: 17 BRPM | HEIGHT: 68 IN | TEMPERATURE: 97.6 F | BODY MASS INDEX: 23.49 KG/M2 | DIASTOLIC BLOOD PRESSURE: 70 MMHG

## 2023-02-28 PROCEDURE — 95012 NITRIC OXIDE EXP GAS DETER: CPT

## 2023-02-28 PROCEDURE — 94010 BREATHING CAPACITY TEST: CPT

## 2023-02-28 PROCEDURE — 99214 OFFICE O/P EST MOD 30 MIN: CPT | Mod: 25

## 2023-03-21 ENCOUNTER — OUTPATIENT (OUTPATIENT)
Dept: OUTPATIENT SERVICES | Facility: HOSPITAL | Age: 78
LOS: 1 days | End: 2023-03-21
Payer: MEDICARE

## 2023-03-21 VITALS
OXYGEN SATURATION: 98 % | RESPIRATION RATE: 15 BRPM | HEIGHT: 67 IN | WEIGHT: 162.92 LBS | DIASTOLIC BLOOD PRESSURE: 87 MMHG | TEMPERATURE: 97 F | HEART RATE: 65 BPM | SYSTOLIC BLOOD PRESSURE: 136 MMHG

## 2023-03-21 DIAGNOSIS — Z98.890 OTHER SPECIFIED POSTPROCEDURAL STATES: Chronic | ICD-10-CM

## 2023-03-21 DIAGNOSIS — J84.10 PULMONARY FIBROSIS, UNSPECIFIED: ICD-10-CM

## 2023-03-21 DIAGNOSIS — Z90.89 ACQUIRED ABSENCE OF OTHER ORGANS: Chronic | ICD-10-CM

## 2023-03-21 DIAGNOSIS — M25.562 PAIN IN LEFT KNEE: ICD-10-CM

## 2023-03-21 DIAGNOSIS — M17.12 UNILATERAL PRIMARY OSTEOARTHRITIS, LEFT KNEE: ICD-10-CM

## 2023-03-21 DIAGNOSIS — I48.91 UNSPECIFIED ATRIAL FIBRILLATION: ICD-10-CM

## 2023-03-21 DIAGNOSIS — Z86.79 PERSONAL HISTORY OF OTHER DISEASES OF THE CIRCULATORY SYSTEM: ICD-10-CM

## 2023-03-21 LAB
A1C WITH ESTIMATED AVERAGE GLUCOSE RESULT: 5.7 % — HIGH (ref 4–5.6)
ALBUMIN SERPL ELPH-MCNC: 3.8 G/DL — SIGNIFICANT CHANGE UP (ref 3.3–5)
ALP SERPL-CCNC: 105 U/L — SIGNIFICANT CHANGE UP (ref 30–120)
ALT FLD-CCNC: 23 U/L DA — SIGNIFICANT CHANGE UP (ref 10–60)
ANION GAP SERPL CALC-SCNC: 5 MMOL/L — SIGNIFICANT CHANGE UP (ref 5–17)
APTT BLD: 31.2 SEC — SIGNIFICANT CHANGE UP (ref 27.5–35.5)
AST SERPL-CCNC: 24 U/L — SIGNIFICANT CHANGE UP (ref 10–40)
BILIRUB SERPL-MCNC: 0.4 MG/DL — SIGNIFICANT CHANGE UP (ref 0.2–1.2)
BUN SERPL-MCNC: 21 MG/DL — SIGNIFICANT CHANGE UP (ref 7–23)
CALCIUM SERPL-MCNC: 9 MG/DL — SIGNIFICANT CHANGE UP (ref 8.4–10.5)
CHLORIDE SERPL-SCNC: 104 MMOL/L — SIGNIFICANT CHANGE UP (ref 96–108)
CO2 SERPL-SCNC: 27 MMOL/L — SIGNIFICANT CHANGE UP (ref 22–31)
CREAT SERPL-MCNC: 1.08 MG/DL — SIGNIFICANT CHANGE UP (ref 0.5–1.3)
EGFR: 70 ML/MIN/1.73M2 — SIGNIFICANT CHANGE UP
ESTIMATED AVERAGE GLUCOSE: 117 MG/DL — HIGH (ref 68–114)
GLUCOSE SERPL-MCNC: 104 MG/DL — HIGH (ref 70–99)
HCT VFR BLD CALC: 41.1 % — SIGNIFICANT CHANGE UP (ref 39–50)
HGB BLD-MCNC: 13.7 G/DL — SIGNIFICANT CHANGE UP (ref 13–17)
INR BLD: 1.03 RATIO — SIGNIFICANT CHANGE UP (ref 0.88–1.16)
MCHC RBC-ENTMCNC: 31.6 PG — SIGNIFICANT CHANGE UP (ref 27–34)
MCHC RBC-ENTMCNC: 33.3 GM/DL — SIGNIFICANT CHANGE UP (ref 32–36)
MCV RBC AUTO: 94.7 FL — SIGNIFICANT CHANGE UP (ref 80–100)
NRBC # BLD: 0 /100 WBCS — SIGNIFICANT CHANGE UP (ref 0–0)
PLATELET # BLD AUTO: 260 K/UL — SIGNIFICANT CHANGE UP (ref 150–400)
POTASSIUM SERPL-MCNC: 5.1 MMOL/L — SIGNIFICANT CHANGE UP (ref 3.5–5.3)
POTASSIUM SERPL-SCNC: 5.1 MMOL/L — SIGNIFICANT CHANGE UP (ref 3.5–5.3)
PROT SERPL-MCNC: 7.2 G/DL — SIGNIFICANT CHANGE UP (ref 6–8.3)
PROTHROM AB SERPL-ACNC: 11.9 SEC — SIGNIFICANT CHANGE UP (ref 10.5–13.4)
RBC # BLD: 4.34 M/UL — SIGNIFICANT CHANGE UP (ref 4.2–5.8)
RBC # FLD: 12.3 % — SIGNIFICANT CHANGE UP (ref 10.3–14.5)
SODIUM SERPL-SCNC: 136 MMOL/L — SIGNIFICANT CHANGE UP (ref 135–145)
WBC # BLD: 4.38 K/UL — SIGNIFICANT CHANGE UP (ref 3.8–10.5)
WBC # FLD AUTO: 4.38 K/UL — SIGNIFICANT CHANGE UP (ref 3.8–10.5)

## 2023-03-21 PROCEDURE — 93010 ELECTROCARDIOGRAM REPORT: CPT

## 2023-03-21 PROCEDURE — 93005 ELECTROCARDIOGRAM TRACING: CPT

## 2023-03-21 PROCEDURE — G0463: CPT

## 2023-03-21 NOTE — H&P PST ADULT - ASSESSMENT
CC:  Gabi Avelar is here today for yearly.    Medications: medications verified and updated  Refills needed today?  NO  Pharmacy: verified   Patient would like communication of their results via:    Home Phone: [phone] (home)  Okay to leave a message containing results? Yes        this is  a 76 y/o male who is scheduled for  left knee replacement on 4/10/23

## 2023-03-21 NOTE — H&P PST ADULT - NSICDXPASTSURGICALHX_GEN_ALL_CORE_FT
PAST SURGICAL HISTORY:  H/O cardiac catheterization 2010    H/O cardiac radiofrequency ablation     S/P eye surgery     S/P hernia repair     S/P tonsillectomy

## 2023-03-21 NOTE — H&P PST ADULT - HISTORY OF PRESENT ILLNESS
this is a 78 y/o male who has had pain left knee for about 2 yrs, has had injections with no relief, tests show bone on bone; to have left knee replacement

## 2023-03-21 NOTE — H&P PST ADULT - PROBLEM SELECTOR PLAN 1
left total knee replacement, covid test 4/7/23 [facility], preop instructions given, to go for medical, pulm and cardiac clearance

## 2023-03-21 NOTE — H&P PST ADULT - NSICDXPASTMEDICALHX_GEN_ALL_CORE_FT
PAST MEDICAL HISTORY:  A-fib ablation 2011, cardioversion 2015    H/O orthostatic hypotension long time ago    H/O pulmonary fibrosis     H/O pulmonary hypertension     Irregular heart rhythm     Lumbar stenosis     Macular pucker, left     Tachycardia

## 2023-03-22 LAB
MRSA PCR RESULT.: SIGNIFICANT CHANGE UP
S AUREUS DNA NOSE QL NAA+PROBE: SIGNIFICANT CHANGE UP

## 2023-04-03 NOTE — ASSESSMENT
[FreeTextEntry1] : Mr. Knapp is a 77 year old male who has a h/o AF/flutter, macular pucker, allergy, low vitamin D, RAD, PAH, and SERAFIN who is now mildly compromised from a pulmonary perspective. His number one issue is macular pucker, orthopedic (still) - awaiting left knee surgery\par \par \par                                   **************PRE-OP CLEARANCE FOR TOTAL LEFT KNEE REPLACEMENT***********\par -at this point in time there are no absolute pulmonary contraindications to go forward with the planned procedure \par -at the time of surgery s/he should have optimal pain control, incentive spirometry, early ambulation, DVT and GI prophylaxis. \par \par His progressive SOB is currently stable and is multifactorial due to:\par -exercise\par -PAH\par -RADS\par -?cardiac disease\par -poor mechanics of breathing\par \par problem 1: history of PAH, exercise induced (RHC @ rest - No PAH 9/2021)\par -previously failed:, Revatio, Adcirca, Calcium channel blockers, and Tracleer; on Adcirca 20 mg q event (Tadalafil)\par -considering Uptavi (IP Antagonist) / Tyvaso pre exercise (or Ventavis) \par \par -Disorder of the pulmonary arteries, important to distinguish the difference between pulmonary arterial hypertension which is idiopathic to secondary pulmonary hypertension which is related to heart disease being diastolic dysfunction or congestive heart failure. Diagnostics include an initial echocardiogram evaluating the pulmonary artery pressures, if this is abnormal, to proceed with a VQ scan as well as a CTPA and an eventual right heart catheterization (No medication can be prescribed until the right heart catheterization). If present, the evaluation will include rheumatological blood testing, HIV testing, and potential evaluation for cirrhosis. Drug classes include PED5 (Revatio, Adcirca) ETRA (Tracleer, Macitentan, Letairis), Soluble guanylate cyclase (Adempas) Prostacyclins (Uptravi, Tyavso, Ventavis, Remodulin, or Orenitram derivatives). \par \par \par problem 2: RADS; \par -s/p trial of new triple MDI \par -failed Utibron,Bevespi, Seebri, Spiriva, Incruse, Anoro, Duaklir, Breztri\par -continue to use Ventolin or Xopenex  PRN and before exercise, PRN\par \par -Inhaler technique reviewed as well as oral hygiene techniques reviewed with patient. Avoidance of cold air, extremes of temperature, rescue inhaler should be used before exercise. Order of medication reviewed with patient. Recommended use of a cool mist humidifier in the bedroom. \par \par Problem 2A: Allergies\par -continue Claritin 10 mg QD\par Environmental measures for allergies were encouraged including mattress and pillow cover, air purifier, and environmental controls.\par \par problem 3: prior CTA\par -ruled out pulmonary embolism and any underlying disease\par \par problem 4: cardiac component (Chinitz)\par -continue to follow up with cardiologist - repeat cardiopulmonary stress test \par - pulmonary cardiac stress test was normal\par \par problem 5: SERAFIN\par -continue to use Oxy-Aid by Respitec / "Sleep Rite"\par -recommended to try Boron supplements 6 mg BID\par -Sleep apnea is associated with adverse clinical consequences which an affect most organ systems. Cardiovascular disease risk includes arrhythmias, atrial fibrillation, hypertension, coronary artery disease, and stroke. Metabolic disorders include diabetes type 2, non-alcoholic fatty liver disease. Mood disorder especially depression; and cognitive decline especially in the elderly. Associations with chronic reflux/Marques’s esophagus some but not all inclusive.\par -Reasons include arousal consistent with hypopnea; respiratory events most prominent in REM sleep or supine position; therefore sleep staging and body position are important for accurate diagnosis and estimation of AHI. \par -Treatment options discussed including CPAP/BiPAP machine, oral appliance, ProVent therapy, Oxy-Aid by Respitec, new technologies, or positional sleep.Recommended use of the CPAP machine for moderate (AHI >15), moderate to severe (AHI 15-30) and severe patients (AHI > 30). Recommended weight loss which can reduce AHI especially in weight loss of greater than 5% of BMI. Positional sleep is recommended in those with low AHI, low-moderate BMI, and younger age. For severe sleep apnea, the hypoglossal nerve stimulator was recommended as well. \par \par problem 6: exercise (ortho limited)\par -s/p Dion Vive\par -recommended Qnol trial\par -recommended to transition from treadmill/swimming/tread water\par -recommended to use natural protein\par \par problem 7: exercise induced pulmonary hypertension\par -he may qualify for prostacyclin drugs including Uptavi or Orenitram or Tyvaso (#1) (or Ventavis) \par -considering Uptavi (pending cardiopulmonary exercise stress test) / Tyvaso\par -Recommended to have an echocardiogram to evaluate for PAH with Dr. Wiliknson/aDnyel\par \par Problem 8: muscle cramps (improved)\par -add MyoCalm PM\par -continue theraworx \par -continue to use Optimal Electrolytes (pre-exercise)\par -recommended Topricin ointment \par \par problem 9: poor mechanics of breathing\par -Recommended Wim Hof and Buteyko breathing techniques \par -Proper breathing techniques were reviewed with an emphasis of exhalation. Patient instructed to breath in for 1 second and out for four seconds. Patient was encouraged to not talk while walking. \par \par -recommended to hydrate before exercise\par -recommended to use a heart rate monitor while exercising\par -recommended to use a natural protein powder \par \par Problem 10: Health Maintenance/COVID19 Precautions:\par -s/p J&J COVID 19 vaccine \par Immune Support Recommendations:\par -OTC Vitamin C 500mg BID \par -OTC Quercetin 250-500mg BID \par -OTC Zinc 75-100mg per day \par -OTC Melatonin 1 or 2 mg a night \par -OTC Vitamin D 1-4000mg per day \par -OTC Tonic Water 8oz per day\par Asthma and COVID19:\par You need to make sure your asthma is under control. This often requires the use of inhaled corticosteroids (and sometimes oral corticosteroids). Inhaled corticosteroids do not likely reduce your immune system’s ability to fight infections, but oral corticosteroids may. It is important to use the steps above to protect yourself to limit your exposure to any respiratory virus.\par \par problem 11: health maintenance \par -going for left ortho surgery \par -Recommend SPM, liposomal glutathione/ L-carnitine\par - Recommended inguinal hernia evaluation\par -Recommended to take Turmeric\par -Recommended to take Lobelia/Iobelia\par -recommended to try optimal electrolytes \par -recommended yearly flu shot after October 15, refused \par -recommended strep pneumonia vaccines: Prevnar-13 vaccine, followed by Pneumo vaccine 23 one year following (completed)\par -recommended early intervention for URIs\par -recommended regular osteoporosis evaluations\par -recommended early dermatological evaluations\par -recommended after the age of 50 to the age of 70, colonoscopy every 5 years \par - recommend Vitamin D supplements \par - recommend probiotic\par  \par F/U in 4 months \par He is encouraged to call with any changes, concerns, or questions. \par \par                   **************PRE-OP CLEARANCE FOR TOTAL LEFT KNEE REPLACEMENT***********\par -at this point in time there are no absolute pulmonary contraindications to go forward with the planned procedure \par -at the time of surgery s/he should have optimal pain control, incentive spirometry, early ambulation, DVT and GI prophylaxis. \par

## 2023-04-03 NOTE — ADDENDUM
[FreeTextEntry1] : **AMENDED BY CB OLGUIN 4.3.2023**\par \par Documented by Jd Barfield acting as a scribe for Dr. Pato Knight on 02/28/2023.\par \par All medical record entries made by the Scribe were at my, Dr. Pato Knight's, direction and personally dictated by me on 02/28/2023. I have reviewed the chart and agree that the record accurately reflects my personal performance of the history, physical exam, assessment and plan. I have also personally directed, reviewed, and agree with the discharge instructions.

## 2023-04-03 NOTE — HISTORY OF PRESENT ILLNESS
[FreeTextEntry1] : Mr. Knapp is a 77 year old male with a history of allergic rhinitis, SERAFIN, PAH, RAD, and SOB presenting to the office today for a follow up visit. His chief complaint is \par \par -he notes his energy levels are good\par -he notes that he had to stop running recently due to knee issues\par -he notes that he is going for knee surgery\par -he notes leg cramps since he stopped running\par -he denies any acute visual issues\par -s/p surgery in eye for macular pucker \par -he notes his bowels are regular \par -he denies snoring \par -he notes his wife says he breathes more heavily at night\par -he notes that his energy levels are 9/10\par -he notes PND at times due to allergies\par -s/p covid 19 vaccine x3\par -he notes exercising (20 mi bike rides, elliptical 5 mi, swimming)\par \par -patient denies any headaches, nausea, vomiting, fever, chills, sweats, chest pain, chest pressure, palpitations, coughing, wheezing, fatigue, diarrhea, constipation, dysphagia, myalgias, dizziness, leg swelling, leg pain, itchy eyes, itchy ears, heartburn, reflux or sour taste in the mouth

## 2023-04-03 NOTE — PROCEDURE
[FreeTextEntry1] : Feno was 17; a normal value being less than 25. Fractional exhaled nitric oxide (FENO) is regarded as a simple, noninvasive method for assessing eosinophilic airway inflammation. Produced by a variety of cells within the lung, nitric oxide (NO) concentrations are generally low in healthy individuals. However, high concentrations of NO appear to be involved in nonspecific host defense mechanisms and chronic inflammatory  diseases such as asthma. The American Thoracic Society (ATS) therefore recommended using FENO to aid in the diagnosis and monitoring of eosinophilic airway inflammation and asthma, and for identifying steroid responsive individuals whose chronic respiratory symptoms may be caused by airway inflammation \par \par PFT revealed normal flows, with a FEV1 of 3.22L, which is 121% of predicted, with a normal flow volume loop

## 2023-04-03 NOTE — PHYSICAL EXAM
[No Acute Distress] : no acute distress [Normal Oropharynx] : normal oropharynx [II] : Mallampati Class: II [Normal Appearance] : normal appearance [No Neck Mass] : no neck mass [Normal S1, S2] : normal s1, s2 [No Murmurs] : no murmurs [No Resp Distress] : no resp distress [Clear to Auscultation Bilaterally] : clear to auscultation bilaterally [No Abnormalities] : no abnormalities [Benign] : benign [Normal Gait] : normal gait [No Clubbing] : no clubbing [No Cyanosis] : no cyanosis [No Edema] : no edema [FROM] : FROM [Normal Color/ Pigmentation] : normal color/ pigmentation [No Focal Deficits] : no focal deficits [Oriented x3] : oriented x3 [Normal Affect] : normal affect [TextBox_68] : I:E 1:3; Clear

## 2023-04-06 ENCOUNTER — NON-APPOINTMENT (OUTPATIENT)
Age: 78
End: 2023-04-06

## 2023-04-09 ENCOUNTER — TRANSCRIPTION ENCOUNTER (OUTPATIENT)
Age: 78
End: 2023-04-09

## 2023-04-10 ENCOUNTER — APPOINTMENT (OUTPATIENT)
Dept: ORTHOPEDIC SURGERY | Facility: HOSPITAL | Age: 78
End: 2023-04-10
Payer: MEDICARE

## 2023-04-10 ENCOUNTER — INPATIENT (INPATIENT)
Facility: HOSPITAL | Age: 78
LOS: 0 days | Discharge: HOME | DRG: 470 | End: 2023-04-11
Attending: ORTHOPAEDIC SURGERY | Admitting: ORTHOPAEDIC SURGERY
Payer: COMMERCIAL

## 2023-04-10 ENCOUNTER — TRANSCRIPTION ENCOUNTER (OUTPATIENT)
Age: 78
End: 2023-04-10

## 2023-04-10 VITALS
HEIGHT: 67 IN | SYSTOLIC BLOOD PRESSURE: 134 MMHG | HEART RATE: 74 BPM | OXYGEN SATURATION: 100 % | DIASTOLIC BLOOD PRESSURE: 89 MMHG | RESPIRATION RATE: 19 BRPM | WEIGHT: 161.6 LBS | TEMPERATURE: 97 F

## 2023-04-10 DIAGNOSIS — Z98.890 OTHER SPECIFIED POSTPROCEDURAL STATES: Chronic | ICD-10-CM

## 2023-04-10 DIAGNOSIS — Z90.89 ACQUIRED ABSENCE OF OTHER ORGANS: Chronic | ICD-10-CM

## 2023-04-10 DIAGNOSIS — M17.12 UNILATERAL PRIMARY OSTEOARTHRITIS, LEFT KNEE: ICD-10-CM

## 2023-04-10 LAB — ABO RH CONFIRMATION: SIGNIFICANT CHANGE UP

## 2023-04-10 PROCEDURE — 27447 TOTAL KNEE ARTHROPLASTY: CPT | Mod: LT

## 2023-04-10 PROCEDURE — 99222 1ST HOSP IP/OBS MODERATE 55: CPT

## 2023-04-10 PROCEDURE — 73562 X-RAY EXAM OF KNEE 3: CPT | Mod: 26,LT

## 2023-04-10 PROCEDURE — 27447 TOTAL KNEE ARTHROPLASTY: CPT | Mod: AS,LT

## 2023-04-10 DEVICE — KIT INSERT TOTAL IDENTITY CR: Type: IMPLANTABLE DEVICE | Site: LEFT | Status: FUNCTIONAL

## 2023-04-10 DEVICE — PIN TROCAR GEN 1/8 X 3IN: Type: IMPLANTABLE DEVICE | Site: LEFT | Status: FUNCTIONAL

## 2023-04-10 DEVICE — CEMENT SIMPLEX P 40GM: Type: IMPLANTABLE DEVICE | Site: LEFT | Status: FUNCTIONAL

## 2023-04-10 RX ORDER — CEFAZOLIN SODIUM 1 G
2000 VIAL (EA) INJECTION ONCE
Refills: 0 | Status: COMPLETED | OUTPATIENT
Start: 2023-04-10 | End: 2023-04-10

## 2023-04-10 RX ORDER — APIXABAN 2.5 MG/1
2.5 TABLET, FILM COATED ORAL EVERY 12 HOURS
Refills: 0 | Status: DISCONTINUED | OUTPATIENT
Start: 2023-04-11 | End: 2023-04-11

## 2023-04-10 RX ORDER — DEXAMETHASONE 0.5 MG/5ML
8 ELIXIR ORAL ONCE
Refills: 0 | Status: COMPLETED | OUTPATIENT
Start: 2023-04-11 | End: 2023-04-11

## 2023-04-10 RX ORDER — ASPIRIN/CALCIUM CARB/MAGNESIUM 324 MG
1 TABLET ORAL
Qty: 28 | Refills: 0
Start: 2023-04-10 | End: 2023-04-23

## 2023-04-10 RX ORDER — CELECOXIB 200 MG/1
200 CAPSULE ORAL EVERY 12 HOURS
Refills: 0 | Status: DISCONTINUED | OUTPATIENT
Start: 2023-04-11 | End: 2023-04-11

## 2023-04-10 RX ORDER — ONDANSETRON 8 MG/1
4 TABLET, FILM COATED ORAL EVERY 6 HOURS
Refills: 0 | Status: DISCONTINUED | OUTPATIENT
Start: 2023-04-10 | End: 2023-04-11

## 2023-04-10 RX ORDER — ONDANSETRON 8 MG/1
4 TABLET, FILM COATED ORAL ONCE
Refills: 0 | Status: DISCONTINUED | OUTPATIENT
Start: 2023-04-10 | End: 2023-04-10

## 2023-04-10 RX ORDER — HYDROMORPHONE HYDROCHLORIDE 2 MG/ML
0.25 INJECTION INTRAMUSCULAR; INTRAVENOUS; SUBCUTANEOUS
Refills: 0 | Status: DISCONTINUED | OUTPATIENT
Start: 2023-04-10 | End: 2023-04-10

## 2023-04-10 RX ORDER — IBUPROFEN 200 MG
1 TABLET ORAL
Qty: 0 | Refills: 0 | DISCHARGE

## 2023-04-10 RX ORDER — OXYCODONE HYDROCHLORIDE 5 MG/1
10 TABLET ORAL
Refills: 0 | Status: DISCONTINUED | OUTPATIENT
Start: 2023-04-10 | End: 2023-04-11

## 2023-04-10 RX ORDER — ACETAMINOPHEN 500 MG
1000 TABLET ORAL EVERY 8 HOURS
Refills: 0 | Status: DISCONTINUED | OUTPATIENT
Start: 2023-04-11 | End: 2023-04-11

## 2023-04-10 RX ORDER — SODIUM CHLORIDE 9 MG/ML
1000 INJECTION, SOLUTION INTRAVENOUS
Refills: 0 | Status: DISCONTINUED | OUTPATIENT
Start: 2023-04-10 | End: 2023-04-11

## 2023-04-10 RX ORDER — APIXABAN 2.5 MG/1
1 TABLET, FILM COATED ORAL
Qty: 28 | Refills: 0
Start: 2023-04-10 | End: 2023-04-23

## 2023-04-10 RX ORDER — OXYCODONE HYDROCHLORIDE 5 MG/1
5 TABLET ORAL
Refills: 0 | Status: DISCONTINUED | OUTPATIENT
Start: 2023-04-10 | End: 2023-04-11

## 2023-04-10 RX ORDER — HYDROMORPHONE HYDROCHLORIDE 2 MG/ML
0.5 INJECTION INTRAMUSCULAR; INTRAVENOUS; SUBCUTANEOUS
Refills: 0 | Status: DISCONTINUED | OUTPATIENT
Start: 2023-04-10 | End: 2023-04-11

## 2023-04-10 RX ORDER — APREPITANT 80 MG/1
40 CAPSULE ORAL ONCE
Refills: 0 | Status: COMPLETED | OUTPATIENT
Start: 2023-04-10 | End: 2023-04-10

## 2023-04-10 RX ORDER — OMEPRAZOLE 10 MG/1
1 CAPSULE, DELAYED RELEASE ORAL
Qty: 28 | Refills: 0
Start: 2023-04-10 | End: 2023-05-07

## 2023-04-10 RX ORDER — TRANEXAMIC ACID 100 MG/ML
1000 INJECTION, SOLUTION INTRAVENOUS ONCE
Refills: 0 | Status: COMPLETED | OUTPATIENT
Start: 2023-04-10 | End: 2023-04-10

## 2023-04-10 RX ORDER — POLYETHYLENE GLYCOL 3350 17 G/17G
17 POWDER, FOR SOLUTION ORAL AT BEDTIME
Refills: 0 | Status: DISCONTINUED | OUTPATIENT
Start: 2023-04-10 | End: 2023-04-11

## 2023-04-10 RX ORDER — CHLORHEXIDINE GLUCONATE 213 G/1000ML
1 SOLUTION TOPICAL ONCE
Refills: 0 | Status: COMPLETED | OUTPATIENT
Start: 2023-04-10 | End: 2023-04-10

## 2023-04-10 RX ORDER — PANTOPRAZOLE SODIUM 20 MG/1
40 TABLET, DELAYED RELEASE ORAL
Refills: 0 | Status: DISCONTINUED | OUTPATIENT
Start: 2023-04-10 | End: 2023-04-11

## 2023-04-10 RX ORDER — SODIUM CHLORIDE 9 MG/ML
1000 INJECTION, SOLUTION INTRAVENOUS
Refills: 0 | Status: DISCONTINUED | OUTPATIENT
Start: 2023-04-10 | End: 2023-04-10

## 2023-04-10 RX ORDER — CELECOXIB 200 MG/1
1 CAPSULE ORAL
Qty: 56 | Refills: 0
Start: 2023-04-10 | End: 2023-05-07

## 2023-04-10 RX ORDER — ACETAMINOPHEN 500 MG
2 TABLET ORAL
Qty: 0 | Refills: 0 | DISCHARGE
Start: 2023-04-10 | End: 2023-04-24

## 2023-04-10 RX ORDER — CEFAZOLIN SODIUM 1 G
2000 VIAL (EA) INJECTION EVERY 8 HOURS
Refills: 0 | Status: COMPLETED | OUTPATIENT
Start: 2023-04-10 | End: 2023-04-11

## 2023-04-10 RX ORDER — ACETAMINOPHEN 500 MG
1000 TABLET ORAL EVERY 6 HOURS
Refills: 0 | Status: COMPLETED | OUTPATIENT
Start: 2023-04-10 | End: 2023-04-11

## 2023-04-10 RX ORDER — SENNA PLUS 8.6 MG/1
2 TABLET ORAL AT BEDTIME
Refills: 0 | Status: DISCONTINUED | OUTPATIENT
Start: 2023-04-10 | End: 2023-04-11

## 2023-04-10 RX ORDER — ACETAMINOPHEN 500 MG
1000 TABLET ORAL ONCE
Refills: 0 | Status: COMPLETED | OUTPATIENT
Start: 2023-04-10 | End: 2023-04-10

## 2023-04-10 RX ORDER — HYDROMORPHONE HYDROCHLORIDE 2 MG/ML
0.5 INJECTION INTRAMUSCULAR; INTRAVENOUS; SUBCUTANEOUS
Refills: 0 | Status: DISCONTINUED | OUTPATIENT
Start: 2023-04-10 | End: 2023-04-10

## 2023-04-10 RX ORDER — MAGNESIUM HYDROXIDE 400 MG/1
30 TABLET, CHEWABLE ORAL DAILY
Refills: 0 | Status: DISCONTINUED | OUTPATIENT
Start: 2023-04-10 | End: 2023-04-11

## 2023-04-10 RX ADMIN — SODIUM CHLORIDE 75 MILLILITER(S): 9 INJECTION, SOLUTION INTRAVENOUS at 15:15

## 2023-04-10 RX ADMIN — Medication 100 MILLIGRAM(S): at 19:21

## 2023-04-10 RX ADMIN — SODIUM CHLORIDE 125 MILLILITER(S): 9 INJECTION, SOLUTION INTRAVENOUS at 21:19

## 2023-04-10 RX ADMIN — CHLORHEXIDINE GLUCONATE 1 APPLICATION(S): 213 SOLUTION TOPICAL at 08:35

## 2023-04-10 RX ADMIN — APREPITANT 40 MILLIGRAM(S): 80 CAPSULE ORAL at 08:35

## 2023-04-10 RX ADMIN — Medication 1000 MILLIGRAM(S): at 18:27

## 2023-04-10 RX ADMIN — Medication 400 MILLIGRAM(S): at 18:24

## 2023-04-10 NOTE — PATIENT PROFILE ADULT - FALL HARM RISK - UNIVERSAL INTERVENTIONS
Bed in lowest position, wheels locked, appropriate side rails in place/Call bell, personal items and telephone in reach/Instruct patient to call for assistance before getting out of bed or chair/Non-slip footwear when patient is out of bed/[place] to call system/Physically safe environment - no spills, clutter or unnecessary equipment/Purposeful Proactive Rounding/Room/bathroom lighting operational, light cord in reach

## 2023-04-10 NOTE — PHYSICAL THERAPY INITIAL EVALUATION ADULT - RANGE OF MOTION EXAMINATION, REHAB EVAL
L LE impairment secondary to surgery/bilateral upper extremity ROM was WFL (within functional limits)/Right LE ROM was WFL (within functional limits)/deficits as listed below

## 2023-04-10 NOTE — PRE-OP CHECKLIST - HEIGHT IN CM
TRANSFER - OUT REPORT:    Verbal report given to Arturo Hogue RN (name) on Vincent Mccoy  being transferred to medical (unit) for routine progression of care       Report consisted of patients Situation, Background, Assessment and   Recommendations(SBAR). Information from the following report(s) SBAR, ED Summary, Intake/Output, MAR and Recent Results was reviewed with the receiving nurse. Lines:   Peripheral IV 10/02/22 Left Antecubital (Active)   Site Assessment Clean, dry, & intact 10/02/22 1426   Phlebitis Assessment 0 10/02/22 1426   Infiltration Assessment 0 10/02/22 1426   Dressing Status Clean, dry, & intact 10/02/22 1426   Action Taken Blood drawn 10/02/22 1426   Alcohol Cap Used Yes 10/02/22 1426       Peripheral IV 10/02/22 Right Forearm (Active)   Site Assessment Clean, dry, & intact 10/02/22 1446   Dressing Status Clean, dry, & intact 10/02/22 1446        Opportunity for questions and clarification was provided.       Patient transported with:   [employer]
170.18

## 2023-04-10 NOTE — DISCHARGE NOTE PROVIDER - NSDCFUSCHEDAPPT_GEN_ALL_CORE_FT
Donis Meza  [facility] Physician [facility]  [address] [facility]  Scheduled Appointment: 04/18/2023

## 2023-04-10 NOTE — DISCHARGE NOTE PROVIDER - NSDCCPTREATMENT_GEN_ALL_CORE_FT
PRINCIPAL PROCEDURE  Procedure: Left total knee arthroplasty  Findings and Treatment: Severe DJD left knee.

## 2023-04-10 NOTE — CONSULT NOTE ADULT - ASSESSMENT
77M Atrial Fibrillation, Pulmonary Fibrosis, Pulmonary HTN, SERAFIN and OA admitted for aftercare following Left TKR    S/P Left TKR  POD 0    Continue Bowel and pain control regimen;    Incentive Spirometer for lung expansion;   Monitor Hgb and follow up electrolytes.      Atrial Fibrillation  S/P Ablation and not on AC   Follow up with Cardiology outpatient  Remote Telemetry and monitor electrolytes    Pulmonary Fibrosis / Pulmonary HTN / SERAFIN  Had RHC done 2 years ago outpatient; No records  Will consult Pulmonary    Diet  Regular    DVT Prophylaxis   Eliquis BID    Disposition  Discharge planning pending hospital course

## 2023-04-10 NOTE — DISCHARGE NOTE PROVIDER - CARE PROVIDER_API CALL
Donis Meza)  Orthopaedic Surgery  [address] [place] [address]  Phone: [phone]  Fax: [phone]  Established Patient  Scheduled Appointment: 04/18/2023 02:15 PM

## 2023-04-10 NOTE — PHYSICAL THERAPY INITIAL EVALUATION ADULT - ADDITIONAL COMMENTS
Pt resides in pvt home with spouse. Pt states 3STE +HR, 12 stairs to second floor bedroom +HR. Pt needs RW and cane. Pt has a walk-in shower. Pt reports was independent prior to admission. Pt resides in pvt home with spouse, available to assist as needed. Pt states 3STE +HR, 12 stairs to second floor bedroom +HR. Pt needs RW and cane. Pt has a walk-in shower. Pt reports was independent prior to admission.

## 2023-04-10 NOTE — DISCHARGE NOTE PROVIDER - NSDCCPCAREPLAN_GEN_ALL_CORE_FT
PRINCIPAL DISCHARGE DIAGNOSIS  Diagnosis: Primary osteoarthritis of left knee  Assessment and Plan of Treatment: For your total knee replacement:  Physical Therapy/Occupational Therapy for: ambulation, transfers, stairs, ADL's (activities of daily living), range of motion exercises, and isometrics  -Activity  • Weight Bearing as tolerated with rolling walker.  • Cryo/cuff 20 minutes several times daily with at least a 1 hour break in between icing sessions  • Take short, frequent walks increasing the distance that you walk each day as tolerated.  • Change your position every hour to decrease pain and stiffness.  • Continue the exercises taught to you by your physical therapist.  • No driving until cleared by the doctor.  • No tub baths, hot tubs, or swimming pools until instructed by your doctor.  • Do not squat down on the floor.  • Do not kneel or twist your knee.  • Range of Motion Goals: Flexion= 120 degrees, Extension = 0 degrees  You have a LUISITO dressing. You may shower. Disconnect LUISITO battery prior to showering. Reconnect battery after showering and press orange button to resume LUISITO power. Remove LUISITO dressing on post-op day #7.  Keep incision clean. DO NOT APPLY ANYTHING to incision site (salves/ointments/creams). Do not scrub incision site. Pat dry after shower.

## 2023-04-10 NOTE — CONSULT NOTE ADULT - ASSESSMENT
78 y/o male who has had pain left knee for about 2 yrs, has had injections with no relief, tests show bone on bone; to have left knee replacement    does not use CPAP  pulm htn - follows with Dr Knight - med list reviewed - outpatient notes reviewed  RAD - Asthma - Proventil PRN   monitor VS and Sat  I rhoda  dvt p   ortho follow up  PT follow up  pain assessment  wound care  sleep hygiene reviewed

## 2023-04-10 NOTE — CONSULT NOTE ADULT - SUBJECTIVE AND OBJECTIVE BOX
HPI:  77M Atrial Fibrillation, Pulmonary Fibrosis, Pulmonary HTN, SERAFIN and OA has been combatting pain in left knee for several years which has progressively worsened.  Patient has tried multiple options for pain relief including OTC medication and as well as PT with minimal relief and has undergone elective replacement of left knee successfully.  Patient is currently resting in bed comfortable with good pain control.     REVIEW OF SYSTEMS:  CONSTITUTIONAL: No fever, weight loss, or fatigue  EYES: No eye pain, visual disturbances, or discharge  ENMT:  No difficulty hearing, tinnitus, vertigo; No sinus or throat pain  NECK: No pain or stiffness  RESPIRATORY: No cough, wheezing, chills or hemoptysis; No shortness of breath  CARDIOVASCULAR: No chest pain, palpitations, dizziness, or leg swelling  GASTROINTESTINAL: No abdominal or epigastric pain. No nausea, vomiting, or hematemesis; No diarrhea or constipation. No melena or hematochezia.  GENITOURINARY: No dysuria, frequency, hematuria, or incontinence  NEUROLOGICAL: No headaches, memory loss, loss of strength, numbness, or tremors  MUSCULOSKELETAL: No muscle or back pain      PAST MEDICAL & SURGICAL HISTORY:  H/O pulmonary fibrosis      H/O pulmonary hypertension      A-fib  ablation 2011, cardioversion 2015      Tachycardia      H/O orthostatic hypotension  long time ago      Lumbar stenosis      Macular pucker, left      Irregular heart rhythm      H/O cardiac catheterization  2010      S/P eye surgery      H/O cardiac radiofrequency ablation      S/P tonsillectomy      S/P hernia repair          SOCIAL HISTORY:  Tobacco; EtOH; Illicit Drugs    Allergies    No Known Allergies    Intolerances        MEDICATIONS  (STANDING):  lactated ringers. 1000 milliLiter(s) (75 mL/Hr) IV Continuous <Continuous>    MEDICATIONS  (PRN):  HYDROmorphone  Injectable 0.25 milliGRAM(s) IV Push every 10 minutes PRN Moderate Pain (4 - 6)  HYDROmorphone  Injectable 0.5 milliGRAM(s) IV Push every 10 minutes PRN Severe Pain (7 - 10)  ondansetron Injectable 4 milliGRAM(s) IV Push once PRN Nausea and/or Vomiting      FAMILY HISTORY:  Family history of heart disease        Vital Signs Last 24 Hrs  T(C): 36.6 (10 Apr 2023 13:12), Max: 36.6 (10 Apr 2023 13:12)  T(F): 97.8 (10 Apr 2023 13:12), Max: 97.8 (10 Apr 2023 13:12)  HR: 61 (10 Apr 2023 15:42) (60 - 74)  BP: 116/84 (10 Apr 2023 16:12) (104/71 - 134/89)  BP(mean): --  RR: 13 (10 Apr 2023 15:42) (11 - 19)  SpO2: 99% (10 Apr 2023 15:42) (96% - 100%)    Parameters below as of 10 Apr 2023 15:42  Patient On (Oxygen Delivery Method): nasal cannula  O2 Flow (L/min): 2      PHYSICAL EXAM:    GENERAL: NAD, well-developed  HEAD:  Atraumatic, Normocephalic  EYES: EOMI, PERRLA, conjunctiva and sclera clear  ENMT: No tonsillar erythema, exudates, or enlargement; Moist mucous membranes, Good dentition, No lesions  NECK: Supple, No JVD, Normal thyroid  NERVOUS SYSTEM:  Alert & Oriented X3, Good concentration;  CHEST/LUNG: Clear to auscultation bilaterally; No rales, rhonchi, wheezing, or rubs  HEART: Regular rate and rhythm; No murmurs, rubs, or gallops  ABDOMEN: Soft, Nontender, Nondistended; Bowel sounds present  EXTREMITIES:  2+ Peripheral Pulses, No clubbing, cyanosis, or edema      LABS:              CAPILLARY BLOOD GLUCOSE          RADIOLOGY & ADDITIONAL STUDIES:    EKG:   Personally Reviewed:  [ ] YES     Imaging:   Personally Reviewed:  [ ] YES     Consultant(s) Notes Reviewed:      Care Discussed with Consultants/Other Providers:               
Date/Time Patient Seen:  		  Referring MD:   Data Reviewed	       Patient is a 77y old  Male who presents with a chief complaint of Left TKR for severe left knee OA.  (10 Apr 2023 15:04)      Subjective/HPI  vs noted  labs reviewed  h and p reviewed  alert  verbal  follows with Dr Antonia Rangel - [facility] office       History of Present Illness:  History of Present Illness	  this is a 78 y/o male who has had pain left knee for about 2 yrs, has had injections with no relief, tests show bone on bone; to have left knee replacement    Pre-Op, Post-Op and Procedure Selector:  ·  PRE-OP DIAGNOSIS:  Primary osteoarthritis of left knee 10-Apr-2023 13:18:10  Lawrence Noel.  ·  POST-OP DIAGNOSIS:  Primary osteoarthritis of left knee 10-Apr-2023 13:18:14  Lawrence Noel.  ·  PROCEDURES:  Left total knee arthroplasty 10-Apr-2023 13:17:47  Lawrence Noel.  PAST MEDICAL & SURGICAL HISTORY:  H/O pulmonary fibrosis    H/O pulmonary hypertension    A-fib  ablation 2011, cardioversion 2015    Tachycardia    H/O orthostatic hypotension  long time ago    Lumbar stenosis    Macular pucker, left    Irregular heart rhythm    H/O cardiac catheterization  2010    S/P eye surgery    H/O cardiac radiofrequency ablation    S/P tonsillectomy    S/P hernia repair      FAMILY HISTORY:  Family history of heart disease.     Anesthesia History:  · Previous Reaction to Anesthesia	none  · Family History of Anesthesia Reaction/Malignant Hyperthermia	No  · Latex Allergy	No    Social History:    Substance Use History:  · Substance Use	caffeine  · Caffeine Type	coffee  · Caffeine Amount/Frequency	5-6 cups/cans per day  · Caffeine Withdrawal Pattern	na     Alcohol Use History:  · Have you ever consumed alcohol	yes...  · Alcohol Frequency	monthly or less  · Alcohol Amount	1-2 drinks  · Problems Related to Alcohol Use	no  · 1. Have you felt you ought to CUT down on your drinking?	no  · 2. Have people ANNOYED you by criticizing your drinking?	no  · 3. Have you ever felt bad or GUILTY about your drinking?	no  · 4. Have you ever needed an "EYE OPENER", a drink first thing in the morning to steady your nerves or get rid of a hangover?	no     Tobacco Usage:  · Tobacco Usage: Never smoker     Passive Smoke Exposure:  · Passive Smoke Exposure	Yes...  · Passive Comment	wife    Other Pertinent History:    Advance Directives:  · Does patient have Advance Directive	Yes  · Indicate Type	Health Care Proxy (HCP)  · Agent's Name	Dayana Knapp, wife  · Phone #	[phone]     Transfusion History:  · Blood Avoidance/Restrictions	none  · Previous Blood Transfusion	no     Reproductive, Female:  · Is Patient Pregnant?	not applicable (Male)    Medications/Review:   Home Medications:   * Patient Currently Takes Medications as of 21-Mar-2023 11:54 documented in Structured Notes  · 	taladafil: Last Dose Taken:  , 20 milligram(s) orally once a day, As Needed  · 	ibuprofen 200 mg oral tablet: Last Dose Taken:  , 1 tab(s) orally every 6 hours, As Needed        Medication list         MEDICATIONS  (STANDING):  acetaminophen   IVPB .. 1000 milliGRAM(s) IV Intermittent every 6 hours  ceFAZolin   IVPB 2000 milliGRAM(s) IV Intermittent every 8 hours  lactated ringers. 1000 milliLiter(s) (125 mL/Hr) IV Continuous <Continuous>  pantoprazole    Tablet 40 milliGRAM(s) Oral before breakfast  polyethylene glycol 3350 17 Gram(s) Oral at bedtime  senna 2 Tablet(s) Oral at bedtime    MEDICATIONS  (PRN):  aluminum hydroxide/magnesium hydroxide/simethicone Suspension 30 milliLiter(s) Oral four times a day PRN Indigestion  HYDROmorphone  Injectable 0.5 milliGRAM(s) IV Push every 3 hours PRN breakthrough pain  magnesium hydroxide Suspension 30 milliLiter(s) Oral daily PRN Constipation  ondansetron Injectable 4 milliGRAM(s) IV Push every 6 hours PRN Nausea and/or Vomiting  oxyCODONE    IR 5 milliGRAM(s) Oral every 3 hours PRN Moderate Pain (4 - 6)  oxyCODONE    IR 10 milliGRAM(s) Oral every 3 hours PRN Severe Pain (7 - 10)         Vitals log        ICU Vital Signs Last 24 Hrs  T(C): 36.7 (10 Apr 2023 17:17), Max: 36.7 (10 Apr 2023 17:17)  T(F): 98 (10 Apr 2023 17:17), Max: 98 (10 Apr 2023 17:17)  HR: 66 (10 Apr 2023 17:17) (60 - 76)  BP: 127/80 (10 Apr 2023 17:17) (104/71 - 145/83)  BP(mean): --  ABP: --  ABP(mean): --  RR: 15 (10 Apr 2023 17:17) (11 - 19)  SpO2: 97% (10 Apr 2023 17:17) (96% - 100%)    O2 Parameters below as of 10 Apr 2023 17:17  Patient On (Oxygen Delivery Method): room air                 Input and Output:  I&O's Detail    10 Apr 2023 07:01  -  10 Apr 2023 17:55  --------------------------------------------------------  IN:    Lactated Ringers: 2100 mL    Oral Fluid: 700 mL  Total IN: 2800 mL    OUT:    Blood Loss (mL): 100 mL  Total OUT: 100 mL    Total NET: 2700 mL          Lab Data                  Review of Systems	    post op    Objective     Physical Examination    heart s1s2  lung dc BS  head nc      Pertinent Lab findings & Imaging      Wallace:  NO   Adequate UO     I&O's Detail    10 Apr 2023 07:01  -  10 Apr 2023 17:55  --------------------------------------------------------  IN:    Lactated Ringers: 2100 mL    Oral Fluid: 700 mL  Total IN: 2800 mL    OUT:    Blood Loss (mL): 100 mL  Total OUT: 100 mL    Total NET: 2700 mL               Discussed with:     Cultures:	        Radiology      Ventricular Rate 63 BPM    Atrial Rate 50 BPM    QRS Duration 86 ms    Q-T Interval 402 ms    QTC Calculation(Bazett) 411 ms    R Axis 69 degrees    T Axis 22 degrees    Diagnosis Line Atrial fibrillation  Abnormal ECG  No previous ECGs available  Confirmed by DIPAK SEAY MD ([id number]) on 3/21/2023 12:50:52 PM

## 2023-04-10 NOTE — DISCHARGE NOTE PROVIDER - PROVIDER TOKENS
PROVIDER:[TOKEN:[[id number]],SCHEDULEDAPPT:[04/18/2023],SCHEDULEDAPPTTIME:[02:15 PM],ESTABLISHEDPATIENT:[T]]

## 2023-04-10 NOTE — DISCHARGE NOTE PROVIDER - HOSPITAL COURSE
This patient was admitted to [facility] with a history of severe degenerative joint disease of the left knee.  Patient underwent Pre-Surgical Testing and was medically cleared to undergo elective procedure. Patient underwent left TKR by Dr. John Meza on 4/10/23. Procedure was well tolerated.  No operative or bennie-operative complications arose during patient's hospital course.  Patient received antibiotic according to SCIP guidelines for infection prevention.  Eliquis 2.5mg q 12 h was given for DVT prophylaxis, in addition to the use of SCDs.  Anesthesia, Medical Hospitalist, Physical Therapy and Occupational Therapy were consulted. Patient is stable for discharge with a good prognosis.  Appropriate discharge instructions and medications are provided in this document.

## 2023-04-10 NOTE — DISCHARGE NOTE PROVIDER - NSDCMRMEDTOKEN_GEN_ALL_CORE_FT
ibuprofen 200 mg oral tablet: 1 tab(s) orally every 6 hours, As Needed  taladafil: 20 milligram(s) orally once a day, As Needed   acetaminophen 500 mg oral tablet: 2 tab(s) orally every 8 hours  CeleBREX 100 mg oral capsule: 1 cap(s) orally every 12 hours Take at least 2 hours after aspirin  Ecotrin Adult Low Strength 81 mg oral delayed release tablet: 2 tab(s) orally every 12 hours Once Eliquis is completed  Eliquis 2.5 mg oral tablet: 1 tab(s) orally every 12 hours Change to Aspirin 81mg every 12 hours for 14 days, once Eliquis is completed  omeprazole 20 mg oral delayed release capsule: 1 cap(s) orally once a day  oxyCODONE 5 mg oral tablet: 1 tab(s) orally every 4 hours as needed for  severe pain MDD: 6  polyethylene glycol 3350 oral powder for reconstitution: 17 gram(s) orally once a day (at bedtime)  senna leaf extract oral tablet: 2 tab(s) orally once a day (at bedtime)  taladafil: 20 milligram(s) orally once a day, As Needed   acetaminophen 500 mg oral tablet: 2 tab(s) orally every 8 hours  CeleBREX 100 mg oral capsule: 1 cap(s) orally every 12 hours Take at least 2 hours after aspirin  Ecotrin Adult Low Strength 81 mg oral delayed release tablet: 1 orally every 12 hours Start after eliquis course is completed for 14 days  Eliquis 2.5 mg oral tablet: 1 tab(s) orally every 12 hours Change to Aspirin 81mg every 12 hours for 14 days, once Eliquis is completed  omeprazole 20 mg oral delayed release capsule: 1 cap(s) orally once a day  oxyCODONE 5 mg oral tablet: 1 tab(s) orally every 4 hours as needed for  severe pain MDD: 6  polyethylene glycol 3350 oral powder for reconstitution: 17 gram(s) orally once a day (at bedtime)  senna leaf extract oral tablet: 2 tab(s) orally once a day (at bedtime)  taladafil: 20 milligram(s) orally once a day, As Needed

## 2023-04-11 ENCOUNTER — TRANSCRIPTION ENCOUNTER (OUTPATIENT)
Age: 78
End: 2023-04-11

## 2023-04-11 VITALS
SYSTOLIC BLOOD PRESSURE: 146 MMHG | TEMPERATURE: 98 F | RESPIRATION RATE: 15 BRPM | DIASTOLIC BLOOD PRESSURE: 88 MMHG | HEART RATE: 78 BPM | OXYGEN SATURATION: 96 %

## 2023-04-11 LAB
ANION GAP SERPL CALC-SCNC: 11 MMOL/L — SIGNIFICANT CHANGE UP (ref 5–17)
ANION GAP SERPL CALC-SCNC: 12 MMOL/L — SIGNIFICANT CHANGE UP (ref 5–17)
BUN SERPL-MCNC: 26 MG/DL — HIGH (ref 7–23)
BUN SERPL-MCNC: 31 MG/DL — HIGH (ref 7–23)
CALCIUM SERPL-MCNC: 8.5 MG/DL — SIGNIFICANT CHANGE UP (ref 8.4–10.5)
CALCIUM SERPL-MCNC: 8.6 MG/DL — SIGNIFICANT CHANGE UP (ref 8.4–10.5)
CHLORIDE SERPL-SCNC: 105 MMOL/L — SIGNIFICANT CHANGE UP (ref 96–108)
CHLORIDE SERPL-SCNC: 105 MMOL/L — SIGNIFICANT CHANGE UP (ref 96–108)
CO2 SERPL-SCNC: 22 MMOL/L — SIGNIFICANT CHANGE UP (ref 22–31)
CO2 SERPL-SCNC: 24 MMOL/L — SIGNIFICANT CHANGE UP (ref 22–31)
CREAT SERPL-MCNC: 1.36 MG/DL — HIGH (ref 0.5–1.3)
CREAT SERPL-MCNC: 1.55 MG/DL — HIGH (ref 0.5–1.3)
EGFR: 45 ML/MIN/1.73M2 — LOW
EGFR: 53 ML/MIN/1.73M2 — LOW
GLUCOSE SERPL-MCNC: 112 MG/DL — HIGH (ref 70–99)
GLUCOSE SERPL-MCNC: 130 MG/DL — HIGH (ref 70–99)
HCT VFR BLD CALC: 38.9 % — LOW (ref 39–50)
HGB BLD-MCNC: 13 G/DL — SIGNIFICANT CHANGE UP (ref 13–17)
MCHC RBC-ENTMCNC: 31.3 PG — SIGNIFICANT CHANGE UP (ref 27–34)
MCHC RBC-ENTMCNC: 33.4 GM/DL — SIGNIFICANT CHANGE UP (ref 32–36)
MCV RBC AUTO: 93.7 FL — SIGNIFICANT CHANGE UP (ref 80–100)
NRBC # BLD: 0 /100 WBCS — SIGNIFICANT CHANGE UP (ref 0–0)
PLATELET # BLD AUTO: 213 K/UL — SIGNIFICANT CHANGE UP (ref 150–400)
POTASSIUM SERPL-MCNC: 4.6 MMOL/L — SIGNIFICANT CHANGE UP (ref 3.5–5.3)
POTASSIUM SERPL-MCNC: 4.8 MMOL/L — SIGNIFICANT CHANGE UP (ref 3.5–5.3)
POTASSIUM SERPL-SCNC: 4.6 MMOL/L — SIGNIFICANT CHANGE UP (ref 3.5–5.3)
POTASSIUM SERPL-SCNC: 4.8 MMOL/L — SIGNIFICANT CHANGE UP (ref 3.5–5.3)
RBC # BLD: 4.15 M/UL — LOW (ref 4.2–5.8)
RBC # FLD: 12.4 % — SIGNIFICANT CHANGE UP (ref 10.3–14.5)
SODIUM SERPL-SCNC: 139 MMOL/L — SIGNIFICANT CHANGE UP (ref 135–145)
SODIUM SERPL-SCNC: 140 MMOL/L — SIGNIFICANT CHANGE UP (ref 135–145)
WBC # BLD: 9.89 K/UL — SIGNIFICANT CHANGE UP (ref 3.8–10.5)
WBC # FLD AUTO: 9.89 K/UL — SIGNIFICANT CHANGE UP (ref 3.8–10.5)

## 2023-04-11 PROCEDURE — 36415 COLL VENOUS BLD VENIPUNCTURE: CPT

## 2023-04-11 PROCEDURE — 97116 GAIT TRAINING THERAPY: CPT

## 2023-04-11 PROCEDURE — C1713: CPT

## 2023-04-11 PROCEDURE — 27447 TOTAL KNEE ARTHROPLASTY: CPT

## 2023-04-11 PROCEDURE — 97110 THERAPEUTIC EXERCISES: CPT

## 2023-04-11 PROCEDURE — 97161 PT EVAL LOW COMPLEX 20 MIN: CPT

## 2023-04-11 PROCEDURE — 85027 COMPLETE CBC AUTOMATED: CPT

## 2023-04-11 PROCEDURE — 80048 BASIC METABOLIC PNL TOTAL CA: CPT

## 2023-04-11 PROCEDURE — 73562 X-RAY EXAM OF KNEE 3: CPT

## 2023-04-11 PROCEDURE — 97530 THERAPEUTIC ACTIVITIES: CPT

## 2023-04-11 PROCEDURE — 99232 SBSQ HOSP IP/OBS MODERATE 35: CPT

## 2023-04-11 PROCEDURE — 97165 OT EVAL LOW COMPLEX 30 MIN: CPT

## 2023-04-11 PROCEDURE — C1776: CPT

## 2023-04-11 RX ORDER — CELECOXIB 200 MG/1
1 CAPSULE ORAL
Qty: 60 | Refills: 0
Start: 2023-04-11 | End: 2023-05-10

## 2023-04-11 RX ORDER — APIXABAN 2.5 MG/1
1 TABLET, FILM COATED ORAL
Qty: 28 | Refills: 0
Start: 2023-04-11 | End: 2023-04-24

## 2023-04-11 RX ORDER — SODIUM CHLORIDE 9 MG/ML
500 INJECTION INTRAMUSCULAR; INTRAVENOUS; SUBCUTANEOUS ONCE
Refills: 0 | Status: COMPLETED | OUTPATIENT
Start: 2023-04-11 | End: 2023-04-11

## 2023-04-11 RX ORDER — OXYCODONE HYDROCHLORIDE 5 MG/1
1 TABLET ORAL
Qty: 42 | Refills: 0
Start: 2023-04-11 | End: 2023-04-17

## 2023-04-11 RX ORDER — ASPIRIN/CALCIUM CARB/MAGNESIUM 324 MG
1 TABLET ORAL
Qty: 0 | Refills: 0 | DISCHARGE

## 2023-04-11 RX ORDER — POLYETHYLENE GLYCOL 3350 17 G/17G
17 POWDER, FOR SOLUTION ORAL
Qty: 0 | Refills: 0 | DISCHARGE
Start: 2023-04-11

## 2023-04-11 RX ORDER — SENNA PLUS 8.6 MG/1
2 TABLET ORAL
Qty: 0 | Refills: 0 | DISCHARGE
Start: 2023-04-11

## 2023-04-11 RX ORDER — ASPIRIN/CALCIUM CARB/MAGNESIUM 324 MG
2 TABLET ORAL
Qty: 56 | Refills: 0
Start: 2023-04-11 | End: 2023-04-24

## 2023-04-11 RX ADMIN — Medication 101.6 MILLIGRAM(S): at 05:40

## 2023-04-11 RX ADMIN — PANTOPRAZOLE SODIUM 40 MILLIGRAM(S): 20 TABLET, DELAYED RELEASE ORAL at 05:42

## 2023-04-11 RX ADMIN — Medication 400 MILLIGRAM(S): at 00:14

## 2023-04-11 RX ADMIN — Medication 1000 MILLIGRAM(S): at 00:22

## 2023-04-11 RX ADMIN — Medication 1000 MILLIGRAM(S): at 06:25

## 2023-04-11 RX ADMIN — Medication 1000 MILLIGRAM(S): at 05:40

## 2023-04-11 RX ADMIN — SODIUM CHLORIDE 500 MILLILITER(S): 9 INJECTION INTRAMUSCULAR; INTRAVENOUS; SUBCUTANEOUS at 10:29

## 2023-04-11 RX ADMIN — SODIUM CHLORIDE 500 MILLILITER(S): 9 INJECTION INTRAMUSCULAR; INTRAVENOUS; SUBCUTANEOUS at 09:18

## 2023-04-11 RX ADMIN — Medication 100 MILLIGRAM(S): at 03:07

## 2023-04-11 RX ADMIN — APIXABAN 2.5 MILLIGRAM(S): 2.5 TABLET, FILM COATED ORAL at 09:18

## 2023-04-11 NOTE — PATIENT CHOICE NOTE. - NSPTCHOICESTATE_GEN_ALL_CORE

## 2023-04-11 NOTE — PROGRESS NOTE ADULT - SUBJECTIVE AND OBJECTIVE BOX
Post Op Day #1    SUBJECTIVE    78yo Male status post left TKR .   Patient is alert and comfortable.    Pain is controlled with current pain regimen.  Denies nausea, vomiting, chest pain, shortness of breath, abdominal pain or fever.   No new complaints.    OBJECTIVE    Vital Signs Last 24 Hrs  T(C): 36.5 (11 Apr 2023 08:15), Max: 36.7 (10 Apr 2023 17:17)  T(F): 97.7 (11 Apr 2023 08:15), Max: 98 (10 Apr 2023 17:17)  HR: 74 (11 Apr 2023 08:15) (60 - 76)  BP: 142/90 (11 Apr 2023 08:15) (104/71 - 145/83)  BP(mean): --  RR: 17 (11 Apr 2023 08:15) (11 - 19)  SpO2: 96% (11 Apr 2023 08:15) (94% - 100%)    Parameters below as of 11 Apr 2023 08:15  Patient On (Oxygen Delivery Method): room air      I&O's Summary    10 Apr 2023 07:01  -  11 Apr 2023 07:00  --------------------------------------------------------  IN: 3925 mL / OUT: 500 mL / NET: 3425 mL        Left knee  LUISITO dressing is clean, dry and intact.   The calf is supple/nontender.   Sensation to light touch is grossly intact distally.   Motor function distally is intact.   No foot drop.   (2+) dorsalis pedis pulse. Capillary refill is less than 2 seconds. No cyanosis.                          13.0   9.89  )-----------( 213      ( 11 Apr 2023 06:00 )             38.9<L>  11 Apr 2023 06:00    11 Apr 2023 06:00    139    |  105    |  26<H>  ----------------------------<  112<H>  4.6     |  22     |  1.36<H>    Ca    8.6        11 Apr 2023 06:00        ASSESSMENT AND PLAN    - Orthopedically stable  - DVT prophylaxis: PAS + Eliquis 2.5mg twice daily  - Continue physical therapy and occupational therapy  - Weight bearing as tolerated of the left lower extremity with assistance of a walker  - Incentive spirometry encouraged  - Pain control as clinically indicated  - Disposition:  Home when cleared by medicine and PT/OT        Post Op Day #1    SUBJECTIVE    78yo Male status post left TKR .   Patient is alert and comfortable.    Pain is controlled with current pain regimen.  Denies nausea, vomiting, chest pain, shortness of breath, abdominal pain or fever.   No new complaints.    OBJECTIVE    Vital Signs Last 24 Hrs  T(C): 36.5 (11 Apr 2023 08:15), Max: 36.7 (10 Apr 2023 17:17)  T(F): 97.7 (11 Apr 2023 08:15), Max: 98 (10 Apr 2023 17:17)  HR: 74 (11 Apr 2023 08:15) (60 - 76)  BP: 142/90 (11 Apr 2023 08:15) (104/71 - 145/83)  BP(mean): --  RR: 17 (11 Apr 2023 08:15) (11 - 19)  SpO2: 96% (11 Apr 2023 08:15) (94% - 100%)    Parameters below as of 11 Apr 2023 08:15  Patient On (Oxygen Delivery Method): room air      I&O's Summary    10 Apr 2023 07:01  -  11 Apr 2023 07:00  --------------------------------------------------------  IN: 3925 mL / OUT: 500 mL / NET: 3425 mL        Left knee  LUISITO dressing is clean, dry and intact.   The calf is supple/nontender.   Sensation to light touch is grossly intact distally.   Motor function distally is intact.   No foot drop.   (2+) dorsalis pedis pulse. Capillary refill is less than 2 seconds. No cyanosis.                          13.0   9.89  )-----------( 213      ( 11 Apr 2023 06:00 )             38.9<L>  11 Apr 2023 06:00    11 Apr 2023 06:00    139    |  105    |  26<H>  ----------------------------<  112<H>  4.6     |  22     |  1.36<H>    Ca    8.6        11 Apr 2023 06:00        ASSESSMENT AND PLAN    - Orthopedically stable  - SILKE- Fluid bolus ordered   - DVT prophylaxis: PAS + Eliquis 2.5mg twice daily  - Continue physical therapy and occupational therapy  - Weight bearing as tolerated of the left lower extremity with assistance of a walker  - Incentive spirometry encouraged  - Pain control as clinically indicated  - Disposition:  Home when cleared by medicine and PT/OT        Post Op Day #1    SUBJECTIVE    78yo Male status post left TKR .   Patient is alert and comfortable.    Pain is controlled with current pain regimen.  Denies nausea, vomiting, chest pain, shortness of breath, abdominal pain or fever.   No new complaints.    OBJECTIVE    Vital Signs Last 24 Hrs  T(C): 36.5 (11 Apr 2023 08:15), Max: 36.7 (10 Apr 2023 17:17)  T(F): 97.7 (11 Apr 2023 08:15), Max: 98 (10 Apr 2023 17:17)  HR: 74 (11 Apr 2023 08:15) (60 - 76)  BP: 142/90 (11 Apr 2023 08:15) (104/71 - 145/83)  BP(mean): --  RR: 17 (11 Apr 2023 08:15) (11 - 19)  SpO2: 96% (11 Apr 2023 08:15) (94% - 100%)    Parameters below as of 11 Apr 2023 08:15  Patient On (Oxygen Delivery Method): room air      I&O's Summary    10 Apr 2023 07:01  -  11 Apr 2023 07:00  --------------------------------------------------------  IN: 3925 mL / OUT: 500 mL / NET: 3425 mL        Left knee  LUISITO dressing is clean, dry and intact.   The calf is supple/nontender.   Sensation to light touch is grossly intact distally.   Motor function distally is intact.   No foot drop.   (2+) dorsalis pedis pulse. Capillary refill is less than 2 seconds. No cyanosis.                          13.0   9.89  )-----------( 213      ( 11 Apr 2023 06:00 )             38.9<L>  11 Apr 2023 06:00    11 Apr 2023 06:00    139    |  105    |  26<H>  ----------------------------<  112<H>  4.6     |  22     |  1.36<H>    Ca    8.6        11 Apr 2023 06:00        ASSESSMENT AND PLAN    - Orthopedically stable  - SILKE- Fluid boluses ordered. Repeat bmp at noon  - DVT prophylaxis: PAS + Eliquis 2.5mg twice daily  - Continue physical therapy and occupational therapy  - Weight bearing as tolerated of the left lower extremity with assistance of a walker  - Incentive spirometry encouraged  - Pain control as clinically indicated  - Disposition:  Home when cleared by medicine and PT/OT

## 2023-04-11 NOTE — DISCHARGE NOTE NURSING/CASE MANAGEMENT/SOCIAL WORK - NSDCPEFALRISK_GEN_ALL_CORE
For information on Fall & Injury Prevention, visit: [email or web address] OR  [email or web address] OR  https://www.cdc.gov/steadi/patient.html

## 2023-04-11 NOTE — OCCUPATIONAL THERAPY INITIAL EVALUATION ADULT - PREDICTED DURATION OF THERAPY (DAYS/WKS), OT EVAL
1-2 sessions Brow Lift Text: A midfrontal incision was made medially to the defect to allow access to the tissues just superior to the left eyebrow. Following careful dissection inferiorly in a supraperiosteal plane to the level of the left eyebrow, several 3-0 monocryl sutures were used to resuspend the eyebrow orbicularis oculi muscular unit to the superior frontal bone periosteum. This resulted in an appropriate reapproximation of static eyebrow symmetry and correction of the left brow ptosis.

## 2023-04-11 NOTE — PROGRESS NOTE ADULT - SUBJECTIVE AND OBJECTIVE BOX
Subjective: Patient sitting in chair. Comfortable. No light headedness or chest pain.  Walked the hallways earlier.     MEDICATIONS  (STANDING):  acetaminophen     Tablet .. 1000 milliGRAM(s) Oral every 8 hours  apixaban 2.5 milliGRAM(s) Oral every 12 hours  lactated ringers. 1000 milliLiter(s) (125 mL/Hr) IV Continuous <Continuous>  pantoprazole    Tablet 40 milliGRAM(s) Oral before breakfast  polyethylene glycol 3350 17 Gram(s) Oral at bedtime  senna 2 Tablet(s) Oral at bedtime  sodium chloride 0.9% Bolus 500 milliLiter(s) IV Bolus once    MEDICATIONS  (PRN):  aluminum hydroxide/magnesium hydroxide/simethicone Suspension 30 milliLiter(s) Oral four times a day PRN Indigestion  HYDROmorphone  Injectable 0.5 milliGRAM(s) IV Push every 3 hours PRN breakthrough pain  magnesium hydroxide Suspension 30 milliLiter(s) Oral daily PRN Constipation  ondansetron Injectable 4 milliGRAM(s) IV Push every 6 hours PRN Nausea and/or Vomiting  oxyCODONE    IR 5 milliGRAM(s) Oral every 3 hours PRN Moderate Pain (4 - 6)  oxyCODONE    IR 10 milliGRAM(s) Oral every 3 hours PRN Severe Pain (7 - 10)      Allergies    No Known Allergies    Intolerances        Vital Signs Last 24 Hrs  T(C): 36.5 (11 Apr 2023 08:15), Max: 36.7 (10 Apr 2023 17:17)  T(F): 97.7 (11 Apr 2023 08:15), Max: 98 (10 Apr 2023 17:17)  HR: 74 (11 Apr 2023 08:15) (60 - 76)  BP: 142/90 (11 Apr 2023 08:15) (104/71 - 145/83)  BP(mean): --  RR: 17 (11 Apr 2023 08:15) (11 - 19)  SpO2: 96% (11 Apr 2023 08:15) (94% - 100%)    Parameters below as of 11 Apr 2023 08:15  Patient On (Oxygen Delivery Method): room air        PHYSICAL EXAM:  GENERAL: NAD, well-groomed, well-developed  HEAD:  Atraumatic, Normocephalic  ENMT: Moist mucous membranes,   NECK: Supple, No JVD, Normal thyroid  NERVOUS SYSTEM:  All 4 extremities mobile, no gross sensory deficits.   CHEST/LUNG: Clear to auscultation bilaterally; No rales, rhonchi, wheezing, or rubs  HEART: Regular rate and rhythm; No murmurs, rubs, or gallops  ABDOMEN: Soft, Nontender, Nondistended; Bowel sounds present  EXTREMITIES:  2+ Peripheral Pulses, No clubbing, cyanosis, or edema      LABS:                        13.0   9.89  )-----------( 213      ( 11 Apr 2023 06:00 )             38.9     11 Apr 2023 06:00    139    |  105    |  26     ----------------------------<  112    4.6     |  22     |  1.36     Ca    8.6        11 Apr 2023 06:00          CAPILLARY BLOOD GLUCOSE          RADIOLOGY & ADDITIONAL TESTS:    Imaging Personally Reviewed:  [ ] YES     Consultant(s) Notes Reviewed:      Care Discussed with Consultants/Other Providers:    Advanced Directives: [ ] DNR  [ ] No feeding tube  [ ] MOLST in chart  [ ] MOLST completed today  [ ] Unknown

## 2023-04-11 NOTE — CARE COORDINATION ASSESSMENT. - NSPASTMEDSURGHISTORY_GEN_ALL_CORE_FT
PAST MEDICAL & SURGICAL HISTORY:  Tachycardia      A-fib  ablation 2011, cardioversion 2015      H/O pulmonary hypertension      H/O pulmonary fibrosis      H/O cardiac catheterization  2010      H/O orthostatic hypotension  long time ago      Irregular heart rhythm      Macular pucker, left      Lumbar stenosis      S/P hernia repair      S/P tonsillectomy      H/O cardiac radiofrequency ablation      S/P eye surgery

## 2023-04-11 NOTE — DISCHARGE NOTE NURSING/CASE MANAGEMENT/SOCIAL WORK - NSSCNAMETXT_GEN_ALL_CORE
[facility] care agency [phone] will reach out to you within 24-72 hours of your discharge to schedule home care visit/eval appointment with you. Please call agency for any queries regarding home care services

## 2023-04-11 NOTE — OCCUPATIONAL THERAPY INITIAL EVALUATION ADULT - LIVES WITH, PROFILE
Pt lives with his spouse in a private home, 3 steps to enter, 12 steps inside with a walk in shower. Pt was independent with ADLs, IADLs, functional mobility/transfers prior to admission without AD./spouse

## 2023-04-11 NOTE — CARE COORDINATION ASSESSMENT. - NSCAREPROVIDERS_GEN_ALL_CORE_FT
CARE PROVIDERS:  Administration: Marlena Roberst  Admitting: Donis Meza  Attending: Donis Meza  Consultant: Gelacio Pisano  Consultant: Klaus Alberts  Infection Control: Carlee Chaudhry  Nurse: Eric Pollard  Nurse: Juana Up  Occupational Therapy: Rola Tran  Override: Jay Mcmahon  PCA/Nursing Assistant: Allison Bejarano  Physical Therapy: Janie Gaxiola  Physical Therapy: Arik Phillips  Primary Team: Lawrence Zapien  Primary Team: Mikey Burch  Primary Team: Aparna Lancaster  Primary Team: Lawrence Noel  Primary Team: Medardo Jenkins  Team: [facility], Team  Team: [facility], Team  UR// Supp. Assoc.: Allison Serra

## 2023-04-11 NOTE — PROGRESS NOTE ADULT - SUBJECTIVE AND OBJECTIVE BOX
Date/Time Patient Seen:  		  Referring MD:   Data Reviewed	       Patient is a 77y old  Male who presents with a chief complaint of Left TKR for severe left knee OA.  (10 Apr 2023 15:04)      Subjective/HPI     PAST MEDICAL & SURGICAL HISTORY:  H/O pulmonary fibrosis    H/O pulmonary hypertension    A-fib  ablation 2011, cardioversion 2015    Tachycardia    H/O orthostatic hypotension  long time ago    Lumbar stenosis    Macular pucker, left    Irregular heart rhythm    H/O cardiac catheterization  2010    S/P eye surgery    H/O cardiac radiofrequency ablation    S/P tonsillectomy    S/P hernia repair          Medication list         MEDICATIONS  (STANDING):  acetaminophen     Tablet .. 1000 milliGRAM(s) Oral every 8 hours  apixaban 2.5 milliGRAM(s) Oral every 12 hours  celecoxib 200 milliGRAM(s) Oral every 12 hours  lactated ringers. 1000 milliLiter(s) (125 mL/Hr) IV Continuous <Continuous>  pantoprazole    Tablet 40 milliGRAM(s) Oral before breakfast  polyethylene glycol 3350 17 Gram(s) Oral at bedtime  senna 2 Tablet(s) Oral at bedtime    MEDICATIONS  (PRN):  aluminum hydroxide/magnesium hydroxide/simethicone Suspension 30 milliLiter(s) Oral four times a day PRN Indigestion  HYDROmorphone  Injectable 0.5 milliGRAM(s) IV Push every 3 hours PRN breakthrough pain  magnesium hydroxide Suspension 30 milliLiter(s) Oral daily PRN Constipation  ondansetron Injectable 4 milliGRAM(s) IV Push every 6 hours PRN Nausea and/or Vomiting  oxyCODONE    IR 5 milliGRAM(s) Oral every 3 hours PRN Moderate Pain (4 - 6)  oxyCODONE    IR 10 milliGRAM(s) Oral every 3 hours PRN Severe Pain (7 - 10)         Vitals log        ICU Vital Signs Last 24 Hrs  T(C): 36.6 (11 Apr 2023 03:30), Max: 36.7 (10 Apr 2023 17:17)  T(F): 97.8 (11 Apr 2023 03:30), Max: 98 (10 Apr 2023 17:17)  HR: 70 (11 Apr 2023 03:30) (60 - 76)  BP: 143/85 (11 Apr 2023 03:30) (104/71 - 145/83)  BP(mean): --  ABP: --  ABP(mean): --  RR: 16 (11 Apr 2023 03:30) (11 - 19)  SpO2: 94% (11 Apr 2023 03:30) (94% - 100%)    O2 Parameters below as of 10 Apr 2023 17:17  Patient On (Oxygen Delivery Method): room air                 Input and Output:  I&O's Detail    10 Apr 2023 07:01  -  11 Apr 2023 05:52  --------------------------------------------------------  IN:    Lactated Ringers: 2100 mL    Lactated Ringers: 1125 mL    Oral Fluid: 700 mL  Total IN: 3925 mL    OUT:    Blood Loss (mL): 100 mL  Total OUT: 100 mL    Total NET: 3825 mL          Lab Data                  Review of Systems	      Objective     Physical Examination    heart s1s2  lung dc BS  head nc      Pertinent Lab findings & Imaging      [place]:  NO   Adequate UO     I&O's Detail    10 Apr 2023 07:01  -  11 Apr 2023 05:52  --------------------------------------------------------  IN:    Lactated Ringers: 2100 mL    Lactated Ringers: 1125 mL    Oral Fluid: 700 mL  Total IN: 3925 mL    OUT:    Blood Loss (mL): 100 mL  Total OUT: 100 mL    Total NET: 3825 mL               Discussed with:     Cultures:	        Radiology

## 2023-04-11 NOTE — CARE COORDINATION ASSESSMENT. - NSDCPLANSERVICES_GEN_ALL_CORE
s/p lt knee replacement    Met with  pt at the bedside and reviewed role and availability of CM throughout her hospital stay.  Pt appears well, has no complaints and is aware and agreeable of dc plans for today for home with home PT services.  Pt denies any prior HCS.  Reports he has  crutches at home. Referral sent to [facility] [phone] for rolling walker. The patient has a mobility limitation that significantly impairs the ability to participate in one or more mobility related activities of daily living in the home. The patient is able to safely use the rolling walker. The functional mobility deficit can be sufficiently resolved by use of rolling walker.  Patient lives with his wife in a private house with 3 +12 steps in the home. Prior to surgery pt was very active including working, driving and working out daily. Stated his wife  Dayana will be  his caregiver and will transport him home today.    DC folder w/ list of certified hc agencies provided to the patient/ pt chose [facility].  Referral will be sent accordingly for SOC 4/12/2023   HC expectations,/ medicare guidelines, criteria explained to the patient w/ good understanding. Information on transition team provided and reviewed.  CM contact info provided to the pt.  CM will remain available.   pcp Gregorio Wise  pharmacy stop and shop [facility]

## 2023-04-11 NOTE — DISCHARGE NOTE NURSING/CASE MANAGEMENT/SOCIAL WORK - PATIENT PORTAL LINK FT
You can access the FollowMyHealth Patient Portal offered by [facility] by registering at the following website: [email or web address]. By joining [facility]’s FollowMyHealth portal, you will also be able to view your health information using other applications (apps) compatible with our system.

## 2023-04-11 NOTE — DISCHARGE NOTE NURSING/CASE MANAGEMENT/SOCIAL WORK - NSDCFUADDAPPT_GEN_ALL_CORE_FT
It is advisable to follow up with your primary care provider within the next 2-3 weeks to ensure your medications are appropriate and there are no underlying problems after your procedure.   bmp blood work at home with homecare 4/13

## 2023-04-11 NOTE — PROGRESS NOTE ADULT - SUBJECTIVE AND OBJECTIVE BOX
Discharge medication calendar:  Eliquis 2.5mg q12h x 2 weeks then ASA EC 81mg q12h x 2 weeks  APAP 1000mg q8h x 2-3 weeks  Celecoxib 100mg q12h x 2-3 weeks  Omeprazole 20mg QAM x 4 weeks  Narcotic PRN  Docusate 100mg TID while taking narcotic  Miralax, Senna, or Bisacodyl PRN for treatment of constipation

## 2023-04-12 ENCOUNTER — TRANSCRIPTION ENCOUNTER (OUTPATIENT)
Age: 78
End: 2023-04-12

## 2023-04-14 PROBLEM — I49.9 CARDIAC ARRHYTHMIA, UNSPECIFIED: Chronic | Status: ACTIVE | Noted: 2023-03-21

## 2023-04-14 PROBLEM — H35.372 PUCKERING OF MACULA, LEFT EYE: Chronic | Status: ACTIVE | Noted: 2023-03-21

## 2023-04-14 PROBLEM — M48.061 SPINAL STENOSIS, LUMBAR REGION WITHOUT NEUROGENIC CLAUDICATION: Chronic | Status: ACTIVE | Noted: 2023-03-21

## 2023-04-18 ENCOUNTER — APPOINTMENT (OUTPATIENT)
Dept: ORTHOPEDIC SURGERY | Facility: CLINIC | Age: 78
End: 2023-04-18

## 2023-04-27 ENCOUNTER — APPOINTMENT (OUTPATIENT)
Dept: ORTHOPEDIC SURGERY | Facility: CLINIC | Age: 78
End: 2023-04-27
Payer: MEDICARE

## 2023-04-27 ENCOUNTER — RESULT REVIEW (OUTPATIENT)
Age: 78
End: 2023-04-27

## 2023-04-27 VITALS — WEIGHT: 155 LBS | BODY MASS INDEX: 23.49 KG/M2 | HEIGHT: 68 IN

## 2023-04-27 PROCEDURE — 73562 X-RAY EXAM OF KNEE 3: CPT | Mod: LT

## 2023-04-27 PROCEDURE — 99024 POSTOP FOLLOW-UP VISIT: CPT

## 2023-04-27 NOTE — PHYSICAL EXAM
[Left] : left knee [] : ambulation with crutches [FreeTextEntry3] : LLE SWELLING.  [TWNoteComboBox7] : flexion 90 degrees [de-identified] : extension 3 degrees

## 2023-04-27 NOTE — ASSESSMENT
[FreeTextEntry1] : S/P LT TKA 4/10/23: NO F/C/S. DOING WELL. XRAYS REVIEWED WITH COMPONENTS WELL FIXED. NO SIGNS OF INFECTION. GOOD LIGAMENT BALANCE ON EXAM.  DISCUSSED THE IMPORTANCE OF ELEVATION TO REDUCE SWELLING. PROPER ELEVATION TECHNIQUES DISCUSSED. ABX AND PRECAUTIONS REVIEWED. PT RX. QUESTIONS ANSWERED. \par \par WILL ORDER STAT VENOUS DOPPLER LE DUE TO SWELLING POST OP, R/O DVT.

## 2023-04-27 NOTE — HISTORY OF PRESENT ILLNESS
[9] : 9 [5] : 5 [Dull/Aching] : dull/aching [Sharp] : sharp [] : This patient has had an injection before: no [FreeTextEntry1] : left knee [de-identified] : 4/10/23

## 2023-04-27 NOTE — IMAGING
[Left] : left knee [AP] : anteroposterior [Lateral] : lateral [[place]] : skyline [Components well fixed, in good position] : Components well fixed, in good position

## 2023-05-24 NOTE — OCCUPATIONAL THERAPY INITIAL EVALUATION ADULT - DIAGNOSIS, OT EVAL
Pt with impaired ROM, strength, balance impacting pt's ability to complete ADLs, IADLs, functional mobility/transfers. Pt lives in a 2 story home +7STE and 15 steps to the second floor.  Pt reports independence with all aspects of self care and functional mobility without AD PTA. Pt owns cane.

## 2023-05-30 ENCOUNTER — RESULT REVIEW (OUTPATIENT)
Age: 78
End: 2023-05-30

## 2023-05-30 ENCOUNTER — APPOINTMENT (OUTPATIENT)
Dept: ORTHOPEDIC SURGERY | Facility: CLINIC | Age: 78
End: 2023-05-30
Payer: MEDICARE

## 2023-05-30 ENCOUNTER — APPOINTMENT (OUTPATIENT)
Dept: ORTHOPEDIC SURGERY | Facility: CLINIC | Age: 78
End: 2023-05-30

## 2023-05-30 VITALS — BODY MASS INDEX: 23.49 KG/M2 | HEIGHT: 68 IN | WEIGHT: 155 LBS

## 2023-05-30 PROCEDURE — 99024 POSTOP FOLLOW-UP VISIT: CPT

## 2023-05-30 NOTE — ASSESSMENT
[FreeTextEntry1] : S/P LT TKA 4/10/23: NO F/C/S. DOING WELL. XRAYS REVIEWED WITH COMPONENTS WELL FIXED. NO SIGNS OF INFECTION. GOOD LIGAMENT BALANCE ON EXAM.  DISCUSSED THE IMPORTANCE OF ELEVATION TO REDUCE SWELLING. PROPER ELEVATION TECHNIQUES DISCUSSED. ABX AND PRECAUTIONS REVIEWED. PT RX. QUESTIONS ANSWERED. \par \par WILL ORDER STAT VENOUS DOPPLER LLE DUE TO SWELLING POST OP, R/O DVT.

## 2023-05-30 NOTE — PHYSICAL EXAM
[Left] : left knee [] : patient ambulates without assistive device [FreeTextEntry3] : LLE SWELLING.  [TWNoteComboBox7] : flexion 100 degrees [de-identified] : extension 5 degrees

## 2023-05-30 NOTE — HISTORY OF PRESENT ILLNESS
[3] : 3 [Dull/Aching] : dull/aching [Sharp] : sharp [de-identified] : 5/30/23 Pt is here for on post op visit s/p left TKA. Pt states pain has improved. Still has stiffness. Pt is going to PT 2x weekly.  [] : This patient has had an injection before: no [FreeTextEntry1] : left knee [de-identified] : PT [de-identified] : 4/10/23

## 2023-05-30 NOTE — IMAGING
[Left] : left knee [AP] : anteroposterior [Lateral] : lateral [[place]] : skyline [Components well fixed, in good position] : Components well fixed, in good position

## 2023-07-09 ENCOUNTER — FORM ENCOUNTER (OUTPATIENT)
Age: 78
End: 2023-07-09

## 2023-07-10 ENCOUNTER — TRANSCRIPTION ENCOUNTER (OUTPATIENT)
Age: 78
End: 2023-07-10

## 2023-07-18 ENCOUNTER — APPOINTMENT (OUTPATIENT)
Dept: PULMONOLOGY | Facility: CLINIC | Age: 78
End: 2023-07-18
Payer: MEDICARE

## 2023-07-18 VITALS
TEMPERATURE: 97.7 F | WEIGHT: 155 LBS | DIASTOLIC BLOOD PRESSURE: 70 MMHG | OXYGEN SATURATION: 97 % | RESPIRATION RATE: 16 BRPM | HEART RATE: 81 BPM | HEIGHT: 68 IN | SYSTOLIC BLOOD PRESSURE: 122 MMHG | BODY MASS INDEX: 23.49 KG/M2

## 2023-07-18 DIAGNOSIS — R25.2 CRAMP AND SPASM: ICD-10-CM

## 2023-07-18 PROCEDURE — 94010 BREATHING CAPACITY TEST: CPT

## 2023-07-18 PROCEDURE — 95012 NITRIC OXIDE EXP GAS DETER: CPT

## 2023-07-18 PROCEDURE — 94727 GAS DIL/WSHOT DETER LNG VOL: CPT

## 2023-07-18 PROCEDURE — 94729 DIFFUSING CAPACITY: CPT

## 2023-07-18 PROCEDURE — ZZZZZ: CPT

## 2023-07-18 PROCEDURE — 99214 OFFICE O/P EST MOD 30 MIN: CPT | Mod: 25

## 2023-07-18 RX ORDER — IPRATROPIUM BROMIDE AND ALBUTEROL 20; 100 UG/1; UG/1
20-100 SPRAY, METERED RESPIRATORY (INHALATION)
Qty: 3 | Refills: 1 | Status: ACTIVE | COMMUNITY
Start: 2023-07-18 | End: 1900-01-01

## 2023-07-18 NOTE — PROCEDURE
[FreeTextEntry1] : Full PFT reveals normal flows; FEV1 was 2.95 L which is 110% of predicted; normal lung volumes; normal diffusion at 22.1, which is 134% of predicted; normal flow volume loop. PFTs were performed to evaluate for SOB\par \par FENO was 38; a normal value being less than 25\par Fractional exhaled nitric oxide (FENO) is regarded as a simple, noninvasive method for assessing eosinophilic airway inflammation. Produced by a variety of cells within the lung, nitric oxide (NO) concentrations are generally low in healthy individuals. However, high concentrations of NO appear to be involved in nonspecific host defense mechanisms and chronic inflammatory diseases such as asthma. The American Thoracic Society (ATS) therefore has recommended using FENO to aid in the diagnosis and monitoring of eosinophilic airway inflammation and asthma, and for identifying steroid responsive individuals whose chronic respiratory symptoms may be caused by airway inflammation.

## 2023-07-18 NOTE — HISTORY OF PRESENT ILLNESS
[FreeTextEntry1] : Mr. Knapp is a 77 year old male with a history of allergic rhinitis, SERAFIN, PAH, RAD, and SOB presenting to the office today for a follow up visit. His chief complaint is \par \par - he notes his leg is healing\par - he notes his knee is still a bit numb and still swollen\par - he denies any swallowing issues\par - he notes he is getting another operation on his left eye\par - he notes his physical therapists cannot straighten out his knee which led his to push up his ortho appt\par - he notes his weight is stable\par - he notes his sinuses are clear \par - he notes his sleep is okay, will go 4 hours continuous and then get some bits after \par -he denies any headaches, nausea, emesis, fever, chills, sweats, chest pain, chest pressure, coughing, wheezing, palpitations, constipation, diarrhea, vertigo, dysphagia, heartburn, reflux, itchy eyes, itchy ears, leg pain, arthralgias, myalgias, or sour taste in the mouth.

## 2023-07-18 NOTE — ASSESSMENT
[FreeTextEntry1] : Mr. Knapp is a 77 year old male who has a h/o AF/flutter, macular pucker, allergy, low vitamin D, RAD, PAH, and SERAFIN who is now mildly compromised from a pulmonary perspective. His number one issue is macular pucker, orthopedic (still) - s/p left knee surgery - Rehabbing\par \par His progressive SOB is currently stable and is multifactorial due to:\par -exercise\par -PAH\par -RADS\par -?cardiac disease\par -poor mechanics of breathing\par \par problem 1: history of PAH, exercise induced (RHC @ rest - No PAH 9/2021)\par -previously failed:, Revatio, Adcirca, Calcium channel blockers, and Tracleer; on Adcirca 20 mg q event (Tadalafil)\par -considering Uptavi (IP Antagonist) / Tyvaso pre exercise (or Ventavis) \par \par -Disorder of the pulmonary arteries, important to distinguish the difference between pulmonary arterial hypertension which is idiopathic to secondary pulmonary hypertension which is related to heart disease being diastolic dysfunction or congestive heart failure. Diagnostics include an initial echocardiogram evaluating the pulmonary artery pressures, if this is abnormal, to proceed with a VQ scan as well as a CTPA and an eventual right heart catheterization (No medication can be prescribed until the right heart catheterization). If present, the evaluation will include rheumatological blood testing, HIV testing, and potential evaluation for cirrhosis. Drug classes include PED5 (Revatio, Adcirca) ETRA (Tracleer, Macitentan, Letairis), Soluble guanylate cyclase (Adempas) Prostacyclins (Uptravi, Tyavso, Ventavis, Remodulin, or Orenitram derivatives). \par \par \par problem 2: RADS; (FENO > 25)\par -s/p trial of new triple MDI \par -failed Utibron,Bevespi, Seebri, Spiriva, Incruse, Anoro, Duaklir, Breztri\par -continue to use Ventolin or Xopenex  PRN and before exercise, PRN\par -Trial of Combivent 1 inhalation Q6H, PRN \par -Inhaler technique reviewed as well as oral hygiene techniques reviewed with patient. Avoidance of cold air, extremes of temperature, rescue inhaler should be used before exercise. Order of medication reviewed with patient. Recommended use of a cool mist humidifier in the bedroom. \par \par Problem 2A: Allergies\par -continue Claritin 10 mg QD\par Environmental measures for allergies were encouraged including mattress and pillow cover, air purifier, and environmental controls.\par \par problem 3: prior CTA\par -ruled out pulmonary embolism and any underlying disease\par \par problem 4: cardiac component (Chinitz)\par -continue to follow up with cardiologist - repeat cardiopulmonary stress test \par - pulmonary cardiac stress test was normal\par \par problem 5: SERAFIN\par -continue to use Oxy-Aid by Respitec / "Sleep Rite"\par -recommended to try Boron supplements 6 mg BID\par -Sleep apnea is associated with adverse clinical consequences which an affect most organ systems. Cardiovascular disease risk includes arrhythmias, atrial fibrillation, hypertension, coronary artery disease, and stroke. Metabolic disorders include diabetes type 2, non-alcoholic fatty liver disease. Mood disorder especially depression; and cognitive decline especially in the elderly. Associations with chronic reflux/Marques’s esophagus some but not all inclusive.\par -Reasons include arousal consistent with hypopnea; respiratory events most prominent in REM sleep or supine position; therefore sleep staging and body position are important for accurate diagnosis and estimation of AHI. \par -Treatment options discussed including CPAP/BiPAP machine, oral appliance, ProVent therapy, Oxy-Aid by [employer]pitec, new technologies, or positional sleep.Recommended use of the CPAP machine for moderate (AHI >15), moderate to severe (AHI 15-30) and severe patients (AHI > 30). Recommended weight loss which can reduce AHI especially in weight loss of greater than 5% of BMI. Positional sleep is recommended in those with low AHI, low-moderate BMI, and younger age. For severe sleep apnea, the hypoglossal nerve stimulator was recommended as well. \par \par problem 6: exercise (ortho limited)\par -s/p Dion Vive\par -recommended Qnol trial\par -recommended to transition from treadmill/swimming/tread water\par -recommended to use natural protein\par \par problem 7: exercise induced pulmonary hypertension\par -he may qualify for prostacyclin drugs including Uptavi or Orenitram or Tyvaso (#1) (or Ventavis) \par -considering Uptavi (pending cardiopulmonary exercise stress test) / Tyvaso\par -Recommended to have an echocardiogram to evaluate for PAH with Dr. Wilkinson/Danyel\par \par Problem 8: muscle cramps (improved)\par -add MyoCalm PM\par -continue theraworx \par -continue to use Optimal Electrolytes (pre-exercise)\par -recommended Topricin ointment \par \par problem 9: poor mechanics of breathing\par -Recommended Wim Hof and Buteyko breathing techniques \par -Proper breathing techniques were reviewed with an emphasis of exhalation. Patient instructed to breath in for 1 second and out for four seconds. Patient was encouraged to not talk while walking. \par \par -recommended to hydrate before exercise\par -recommended to use a heart rate monitor while exercising\par -recommended to use a natural protein powder \par \par Problem 10: Health Maintenance/COVID19 Precautions:\par -s/p J&J COVID 19 vaccine \par Immune Support Recommendations:\par -OTC Vitamin C 500mg BID \par -OTC Quercetin 250-500mg BID \par -OTC Zinc 75-100mg per day \par -OTC Melatonin 1 or 2 mg a night \par -OTC Vitamin D 1-4000mg per day \par -OTC Tonic Water 8oz per day\par Asthma and COVID19:\par You need to make sure your asthma is under control. This often requires the use of inhaled corticosteroids (and sometimes oral corticosteroids). Inhaled corticosteroids do not likely reduce your immune system’s ability to fight infections, but oral corticosteroids may. It is important to use the steps above to protect yourself to limit your exposure to any respiratory virus.\par \par problem 11: health maintenance \par -going for left ortho surgery \par -Recommend SPM, liposomal glutathione/ L-carnitine\par - Recommended inguinal hernia evaluation\par -Recommended to take Turmeric\par -Recommended to take Lobelia/Iobelia\par -recommended to try optimal electrolytes \par -recommended yearly flu shot after October 15, refused \par -recommended strep pneumonia vaccines: Prevnar-13 vaccine, followed by Pneumo vaccine 23 one year following (completed)\par -recommended early intervention for URIs\par -recommended regular osteoporosis evaluations\par -recommended early dermatological evaluations\par -recommended after the age of 50 to the age of 70, colonoscopy every 5 years \par - recommend Vitamin D supplements \par - recommend probiotic\par  \par F/U in 4 months \par He is encouraged to call with any changes, concerns, or questions. \par \par                   **************PRE-OP CLEARANCE FOR TOTAL LEFT KNEE REPLACEMENT***********\par -at this point in time there are no absolute pulmonary contraindications to go forward with the planned procedure \par -at the time of surgery s/he should have optimal pain control, incentive spirometry, early ambulation, DVT and GI prophylaxis. \par

## 2023-07-18 NOTE — PHYSICAL EXAM
[No Acute Distress] : no acute distress [Normal Oropharynx] : normal oropharynx [II] : Mallampati Class: II [Normal Appearance] : normal appearance [No Neck Mass] : no neck mass [Normal S1, S2] : normal s1, s2 [No Resp Distress] : no resp distress [Clear to Auscultation Bilaterally] : clear to auscultation bilaterally [No Abnormalities] : no abnormalities [Benign] : benign [Normal Gait] : normal gait [No Clubbing] : no clubbing [No Cyanosis] : no cyanosis [No Edema] : no edema [FROM] : FROM [Normal Color/ Pigmentation] : normal color/ pigmentation [No Focal Deficits] : no focal deficits [Oriented x3] : oriented x3 [Normal Affect] : normal affect [TextBox_54] : 2/6 systolic murmur [TextBox_68] : I:E 1:3; Clear

## 2023-07-18 NOTE — ADDENDUM
[FreeTextEntry1] : Documented by Demetris Novoa acting as a scribe for Dr. Pato Knight on 07/18/2023 .\par \par All medical record entries made by the Scribe were at my, Dr. Pato Knight's, direction and personally dictated by me on 07/18/2023 . I have reviewed the chart and agree that the record accurately reflects my personal performance of the history, physical exam, assessment and plan. I have also personally directed, reviewed, and agree with the discharge instructions.

## 2023-07-21 ENCOUNTER — APPOINTMENT (OUTPATIENT)
Dept: ORTHOPEDIC SURGERY | Facility: CLINIC | Age: 78
End: 2023-07-21
Payer: MEDICARE

## 2023-07-21 VITALS — BODY MASS INDEX: 23.49 KG/M2 | WEIGHT: 155 LBS | HEIGHT: 68 IN

## 2023-07-21 DIAGNOSIS — Z96.652 PRESENCE OF LEFT ARTIFICIAL KNEE JOINT: ICD-10-CM

## 2023-07-21 PROCEDURE — 73562 X-RAY EXAM OF KNEE 3: CPT | Mod: LT

## 2023-07-21 PROCEDURE — 99213 OFFICE O/P EST LOW 20 MIN: CPT

## 2023-07-21 NOTE — IMAGING
[Left] : left knee [AP] : anteroposterior [Lateral] : lateral [[place]] : skyline [Components well fixed, in good position] : Components well fixed, in good position

## 2023-07-21 NOTE — HISTORY OF PRESENT ILLNESS
[3] : 3 [Dull/Aching] : dull/aching [Sharp] : sharp [Tightness] : tightness [Intermittent] : intermittent [Household chores] : household chores [Rest] : rest [de-identified] : 5/30/23 Pt is here for [facility] post op visit s/p left TKA. Pt states pain has improved. Still has stiffness. Pt is going to PT 2x weekly.+\par \par 07/21/23 follow up s/p left tka cont physical therapy, limited ROM  [] : This patient has had an injection before: no [FreeTextEntry1] : left knee [de-identified] : AFTER PT [de-identified] : PT [de-identified] : 4/10/23

## 2023-07-21 NOTE — ASSESSMENT
[FreeTextEntry1] : S/P LT TKA 4/10/23: NO F/C/S. DOING WELL. XRAYS REVIEWED WITH COMPONENTS WELL FIXED. NO SIGNS OF INFECTION. GOOD LIGAMENT BALANCE ON EXAM. ABX AND PRECAUTIONS REVIEWED. PT RX. QUESTIONS ANSWERED.

## 2023-07-21 NOTE — PHYSICAL EXAM
[Left] : left knee [] : decreased sensation lateral to incision [TWNoteComboBox7] : flexion 120 degrees [de-identified] : extension 3 degrees

## 2023-07-27 ENCOUNTER — APPOINTMENT (OUTPATIENT)
Dept: ORTHOPEDIC SURGERY | Facility: CLINIC | Age: 78
End: 2023-07-27

## 2023-07-28 ENCOUNTER — APPOINTMENT (OUTPATIENT)
Dept: ORTHOPEDIC SURGERY | Facility: CLINIC | Age: 78
End: 2023-07-28

## 2023-11-20 ENCOUNTER — APPOINTMENT (OUTPATIENT)
Dept: PULMONOLOGY | Facility: CLINIC | Age: 78
End: 2023-11-20
Payer: MEDICARE

## 2023-11-20 VITALS
BODY MASS INDEX: 23.49 KG/M2 | WEIGHT: 155 LBS | DIASTOLIC BLOOD PRESSURE: 82 MMHG | HEART RATE: 65 BPM | RESPIRATION RATE: 16 BRPM | SYSTOLIC BLOOD PRESSURE: 132 MMHG | OXYGEN SATURATION: 98 % | TEMPERATURE: 96.5 F | HEIGHT: 68 IN

## 2023-11-20 PROCEDURE — 94010 BREATHING CAPACITY TEST: CPT

## 2023-11-20 PROCEDURE — 99214 OFFICE O/P EST MOD 30 MIN: CPT | Mod: 25

## 2023-11-20 PROCEDURE — 95012 NITRIC OXIDE EXP GAS DETER: CPT

## 2023-12-06 NOTE — H&P PST ADULT - NSANTHNECKRD_ENT_A_CORE
12/6/2023       RE: Ned Moore  [address]  [place] [address]     Dear Colleague,    Thank you for referring your patient, Ned Moore, to the [facility] [place] at [facility]. Please see a copy of my visit note below.    [facility] Outpatient Rheumatology Consultation  Date of service: December 5, 2023    Patient name: Ned Moore  YOB: 1961  MRN: [id number]    Reason for consult: EGPA    HISTORY OF PRESENT ILLNESS:  Ned is a 62yoM w/ PMHx of COPD, systolic HF, DM and h/o CVA and EGPA w/ cardiac involvement who is presenting to rheumatology clinic for management of EGPA.    Rheumatological History:  - Admitted May of 2023 with abdominal pain, epistaxis, 30 lb weight loss and found to have patchy pulmonary opacities consistent with bronchopneumonia, marked peripheral eosinophilia (>400,000), myocarditis with reduced ejection fraction (48%), moderate proteinuria with mild hematuria, and multifocal ischemia on MRI brain with right sided LEweakness. 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   - Pt did have some past occupation exposures including to fiberglass dust and resin in his 20s, later worked as a tech aide at  and was sensitized to acetate.   - Follows with pulmonology     Pertinent Medications/interventions Tried:   - Azathioprine: nausea, vomiting  - Prednisone taper  - Mepolizumab: 6/26/23 - current    Previously followed by several rheumatologists through [facility]. Diagnosed with EGPA in 05/2023 at [facility]. He was last seen in 10/2023 at [facility] and plan was to continue monitoring. In 9/8/23 cyclophosphamide considered as next step while on mepolizumab and glucocorticoids however pt was concerned of side-effects/cost. Treatment was to be escalated if  "future CM showed disease activity though no evidence of this. Has tapered off prednisone. Presented to ED in 10/2023 with weakness, nausea and vomiting temporally related to starting azathioprine and improved once discontinued so this was stopped. At last rheumatology visit his breathing, cardiopulmonary issues were not worsening. No paresthesias and his foot drop was noted to be improved.     Today:  We reviewed his hospital course and post-hospital course.   Recalls in November of 2022 having similar symptoms and improving greatly with courses of prednisone. Would happen so often that his PCP did not want to continue prescribing prednisone. Then went to pulmonology and started treatment with inhalers.    Things are slow but feels he is progressing. He drove up here and walked to the building. Little unsteady gait as he was standing up for 4 hours last week. Hasn't stressed himself like this for a long time since before his stroke. When he moves he gets a spasm in backs of his legs. Energy is also better. Doesn't think he has chronic fatigue anymore.    No wheezing, lungs feel clear, no coughing. No more nose bleeds. Does have some \"sinusitis\" he thinks as when he lays down his nose stuffed up. He is not having fevers or chills. He was started on amoxicillin for this and is 3 days into this. Is not seeing any improvement thus far. No trouble swallowing. Has noticed a dark grey area in his bottom left visual field that is present in both eyes, R eye is more out of focus in general. This \"dead spot\" is finally getting smaller. He is working with physical therapy for his R side, now strength is about equal whereas it was stronger before L side before he had his stroke. R leg he has to drag into the car. He did have some tripping with R foot but that has strengthened with therapy, no foot drop. Denies any paresthesias. Denies f/c/s, swollen glands.    Never smoked. No asthma as a child. Mother had asthma. His breathing " issues began in adulthood towards end of 2022. No EtOH or illicits.    Injecting mepolizumab on the first of the month. Injection into belly usually and no adverse effects. Steroids have been stopped now for about 3-4 months now.    Past Medical History:  Past Medical History:   Diagnosis Date    Chronic obstructive pulmonary disease, unspecified COPD type (H) 3/15/2023    Chronic systolic heart failure (H) 11/15/2023    Diabetes (H)     History of CVA (cerebrovascular accident) 7/20/2023     Past Surgical History:  No past surgical history on file.    Medications:  Current Outpatient Medications   Medication    ACCU-CHEK FASTCLIX LANCET DRUM    albuterol (PROAIR HFA/PROVENTIL HFA/VENTOLIN HFA) 108 (90 Base) MCG/ACT inhaler    amoxicillin-clavulanate (AUGMENTIN) 875-125 MG tablet    ASPIRIN LOW DOSE 81 MG chewable tablet    atorvastatin (LIPITOR) 20 MG tablet    BD ALEXANDRA U/F 32G X 4 MM insulin pen needle    blood glucose test (ACCU-CHEK SMARTVIEW TEST STRIP) strips    Continuous Blood Gluc Sensor (FREESTYLE LIVIA 2 SENSOR) MISC    Dulaglutide (TRULICITY) 3 MG/0.5ML SOPN    fluticasone-vilanterol (BREO ELLIPTA) 100-25 MCG/ACT inhaler    gabapentin (NEURONTIN) 300 MG capsule    insulin glargine (LANTUS PEN) 100 UNIT/ML pen    melatonin 3 MG tablet    Mepolizumab 100 MG/ML SOSY    metFORMIN (GLUCOPHAGE XR) 500 MG 24 hr tablet    metFORMIN (GLUCOPHAGE) 1000 MG tablet    metoprolol succinate ER (TOPROL XL) 25 MG 24 hr tablet    promethazine (PHENERGAN) 25 MG tablet    tiotropium (SPIRIVA) 18 MCG inhaled capsule    traZODone (DESYREL) 50 MG tablet     No current facility-administered medications for this visit.       Allergies:  Allergies   Allergen Reactions    Azathioprine Nausea and Vomiting     Family history:  No family history of autoimmune disease.    Social History:  ETOH:   Smoking:   Drug use:  Occupation:    OBJECTIVE:  There were no vitals taken for this visit.    GEN: NAD, well-appearing  HEENT: No facial  "rash, sclera clear, no oral or nasal ulcers, no inflammatory nasal bridge/external ear changes, good saliva pool  CV: RRR, normal S1 and S2, no m/r/g  Pulm: CTAB, no crackles, wheezing or rhonchi  Abdomen: soft, non-tender, not distended, no masses  Extremities: Full active and passive ROM of b/l shoulders, elbows, wrists, MCPs, PIPs, DIPs, hips, knees, ankles. Makes full fists b/l with good strength. No synovitis of any joints. No dactylitis. No digital pitting. No nail changes. No sclerodactyly.  Skin: No acute cutaneous changes  Neuro: Gait normal. Strength of bilateral proximal and distal upper and lower extremities is 5/5.     Labs:  WBC Count   Date Value Ref Range Status   11/30/2023 7.2 4.0 - 11.0 10e3/uL Final     Hemoglobin   Date Value Ref Range Status   11/30/2023 11.0 (L) 13.3 - 17.7 g/dL Final     Platelet Count   Date Value Ref Range Status   11/30/2023 374 150 - 450 10e3/uL Final     Creatinine   Date Value Ref Range Status   11/30/2023 1.64 (H) 0.67 - 1.17 mg/dL Final     Lab Results   Component Value Date    ALKPHOS 246 10/26/2023     AST   Date Value Ref Range Status   10/26/2023 20 0 - 45 U/L Final     Comment:     Reference intervals for this test were updated on 6/12/2023 to more accurately reflect our healthy population. There may be differences in the flagging of prior results with similar values performed with this method. Interpretation of those prior results can be made in the context of the updated reference intervals.     Lab Results   Component Value Date    ALT 22 10/26/2023     No results found for: \"SED\"  No results found for: \"CRP\"  UA RESULTS:  Recent Labs   Lab Test 11/30/23  1213   COLOR Light Yellow   APPEARANCE Clear   URINEGLC Negative   URINEBILI Negative   URINEKETONE Negative   SG 1.015   UBLD Negative   URINEPH 5.5   PROTEIN 20*   NITRITE Negative   LEUKEST Trace*   RBCU 2   WBCU 3      Myeloperoxidase Antibody IgG   Date Value Ref Range Status   11/30/2023 Negative " Negative Final     Proteinase 3 Antibody IgG   Date Value Ref Range Status   11/30/2023 Negative Negative Final       Imaging:     CT Chest w/ contrast 05/2023  IMPRESSION:   1. No acute pulmonary embolism or aortopathy.   2.  Peripheral airway centric lung opacities in both lungs consistent with bronchopneumonia.   3.  Reactive lymph nodes in the angelica and subcarinal mediastinum.   4.  No focal inflammatory process in the abdomen or pelvis.   5.  Scattered hypoattenuating liver lesions, incompletely characterized. These most likely represent benign cysts or small hemangioma.     CT angio head/neck 05/2023  IMPRESSION:   HEAD CT:   1.  No acute intracranial hemorrhage.   2.  Few small foci of low attenuation involving the bilateral frontoparietal white matter and may reflect small chronic infarcts or sequela of chronic small vessel ischemic disease.     HEAD CTA:   1.  No significant stenosis, aneurysm, or high flow vascular malformation identified.   2.  Variant [place] of [place] anatomy as above.     NECK CTA:   1.  No significant stenosis of the bilateral internal carotid arteries.     Cardiac MRI 05/2023  There is patchy, mid-myocardial delayed enhancement in a diffuse   pattern throughout the basal into     mid left ventricle. The presence of myocardial edema is suggestive of an   active inflammatory process     such as myocarditis.       Cardiac MRI 9/13/23   Summary    1. The left ventricular cavity is normal in size.     2. There is mildly increased left ventricular wall thickness.     3. Left ventricular ejection fraction is mildly reduced, 46 %, with   hypokinesis of the inferolateral wall.     4. There is scattered subendocardial late gadolinium enhancement (LGE)   throughout the basal to mid inferolateral and the inferoapex with areas of   near transmurality. In comparison to the prior cardiac MRI, the LGE pattern    is   subendocardial compared to mid wall. Given biopsy proven eosinophilic   myocarditis,  this is favored to represent endocardial fibrosis in response   to   eosinophilic infiltrate. There is no myocardial edema in this territory to   suggest acute inflammation.     5. Normal RV size and function.     6. Qualitatively mild aortic insufficiency.     7. Mild biatrial enlargement.    ASSESSMENT & PLAN    Ned Moore is a 62yoM who is presenting to rheumatology clinic for management of EGPA.    EGPA - Dx in 05/2023 at [facility]. MPO/PR3 negative. History of COPD vs. Asthma. Admitted with abdominal pain, epistaxis, 30 lb weight loss and found to have patchy pulmonary opacities consistent with bronchopneumonia, marked peripheral eosinophilia (>400,000), elevated IgE, myocarditis with reduced ejection fraction (48%)  biopsy c/w eosinophilic myocarditis, moderate proteinuria with mild hematuria, and multifocal ischemia on MRI brain with right sided LEweakness. Subsequently started on steroid taper and mepolizumab. Currently off prednisone. Continues treatment with mepolizumab 300 mg every 4 weeks. Cytoxan previously discussed for next step if needing to escalate cares but patient c/f cost/side-effects. Cardiomyopathy is an independent predictor of mortality in EGPA and cyclophosphamide is the preferred agent especially when serologies are negative. ANCA-negative patient more frequently manifest cardiomyopathy and lung involvement. Whereas ANCA-positive patients have more periphral neuropathy, renal involvement and skin findings. ANCA-negative patients with less relapses but higher morbidity 2/2 cardiac involvement. Five-Factor Score was 2 at diagnosis with cardiac involvement and proteinuria which warrants more aggressive treatment. Maintenance of remission can be achieved with GCs, mepolizumab, rituximab and/or DMARDs.      Reference - https://doi.org/10.1038/[email or web address]     High risk medication use - mepolizumab, glucocorticoids     Plan (discussed with patient):  -- Continue mepolizumab 300  mg every 4 weeks for remission maintenance  -- Monitoring labs today, repeat in 3 months - Renal function, UA, prot/cr, cbc w/ diff,   -- For pulmonary involvement and history of COPD vs. Asthma, continue to follow with pulmonology  -- For cardiac involvement - ***  -- For neurologic symptoms - ***    Follow-up: ***    Patient seen and staffed with Dr. Rogelio Strong DO  Rheumatology Fellow  [id number]          Again, thank you for allowing me to participate in the care of your patient.      Sincerely,    Jareth Strong DO     No

## 2023-12-20 ENCOUNTER — RX RENEWAL (OUTPATIENT)
Age: 78
End: 2023-12-20

## 2023-12-20 RX ORDER — TADALAFIL 20 MG/1
20 TABLET ORAL
Qty: 30 | Refills: 5 | Status: ACTIVE | COMMUNITY
Start: 2022-02-16 | End: 1900-01-01

## 2023-12-20 NOTE — PHARMACOTHERAPY INTERVENTION NOTE - PRIMARY MEDICATION
Summary: Shortness of breath due to COPD exacerbation     DATE & TIME: 12/20/23 4454-2276  Cognitive Concerns/ Orientation: AOx4; pleasant  BEHAVIOR & AGGRESSION TOOL COLOR: Green  ABNL VS/O2: VSS; on HFNC at 60L 50% O2 due to increased need and SOB  MOBILITY: SBA with cane  PAIN MANAGMENT: On scheduled lidocaine patch and Tylenol; gave Valium 2.5 mfg IV to help with dyspnea; 2 Lidocaine patches  DIET: Regular- did not eat breakfast due to SOB  BOWEL/BLADDER: Continent. Strict I and Os; still feels full and constipated- gave miralax and senna this AM  ABNORMAL LABS: ; ABGs today ok  DRAIN/DEVICES: Left wrist PIV SL  TELEMETRY RHYTHM: NA  SKIN: Scattered bruising and scabs  TESTS/PROCEDURES: CXR ordered for 12/21  D/C DAY/GOALS/PLACE: Patient had more of an oxygen demand this morning after walking to bathroom on RA; also was very SOB and visibly trying very hard to breath- RR were in the 50s. Bipap trialed but per RT could not tolerate so HFNC applied. Will need sleep study as outpatient to qualify for Bipap at home  OTHER IMPORTANT INFO: Pulmonology following.MD aware and talking with pulmonology.                          tadalafil

## 2024-03-28 ENCOUNTER — APPOINTMENT (OUTPATIENT)
Dept: PULMONOLOGY | Facility: CLINIC | Age: 79
End: 2024-03-28
Payer: MEDICARE

## 2024-03-28 VITALS
WEIGHT: 155 LBS | OXYGEN SATURATION: 98 % | HEART RATE: 76 BPM | DIASTOLIC BLOOD PRESSURE: 80 MMHG | SYSTOLIC BLOOD PRESSURE: 140 MMHG | HEIGHT: 68 IN | RESPIRATION RATE: 16 BRPM | TEMPERATURE: 96.5 F | BODY MASS INDEX: 23.49 KG/M2

## 2024-03-28 DIAGNOSIS — G47.33 OBSTRUCTIVE SLEEP APNEA (ADULT) (PEDIATRIC): ICD-10-CM

## 2024-03-28 DIAGNOSIS — I27.21 SECONDARY PULMONARY ARTERIAL HYPERTENSION: ICD-10-CM

## 2024-03-28 DIAGNOSIS — J45.909 UNSPECIFIED ASTHMA, UNCOMPLICATED: ICD-10-CM

## 2024-03-28 DIAGNOSIS — J30.9 ALLERGIC RHINITIS, UNSPECIFIED: ICD-10-CM

## 2024-03-28 DIAGNOSIS — R06.02 SHORTNESS OF BREATH: ICD-10-CM

## 2024-03-28 PROCEDURE — 94010 BREATHING CAPACITY TEST: CPT

## 2024-03-28 PROCEDURE — 95012 NITRIC OXIDE EXP GAS DETER: CPT

## 2024-03-28 PROCEDURE — 94727 GAS DIL/WSHOT DETER LNG VOL: CPT

## 2024-03-28 PROCEDURE — 94729 DIFFUSING CAPACITY: CPT

## 2024-03-28 PROCEDURE — 99214 OFFICE O/P EST MOD 30 MIN: CPT | Mod: 25

## 2024-03-28 NOTE — PROCEDURE
[FreeTextEntry1] : Full PFT reveals normal flows; FEV1 was  2.79L which is 105% of predicted; normal lung volumes; normal diffusion at 18.8, which is 116% of predicted; normal flow volume loop. PFTs were performed to evaluate for SOB, PAH  FENO was 25; a normal value being less than 25 Fractional exhaled nitric oxide (FENO) is regarded as a simple, noninvasive method for assessing eosinophilic airway inflammation. Produced by a variety of cells within the lung, nitric oxide (NO) concentrations are generally low in healthy individuals. However, high concentrations of NO appear to be involved in nonspecific host defense mechanisms and chronic inflammatory diseases such as asthma. The American Thoracic Society (ATS) therefore has recommended using FENO to aid in the diagnosis and monitoring of eosinophilic airway inflammation and asthma, and for identifying steroid responsive individuals whose chronic respiratory symptoms may be caused by airway inflammation.

## 2024-03-28 NOTE — HISTORY OF PRESENT ILLNESS
[FreeTextEntry1] : Mr. Knapp is an 78 year old male with a history of allergic rhinitis, SERAFIN, PAH, RAD, and SOB presenting to the office today for a follow-up pulmonary evaluation. His chief complaint is   -he denies feeling his irregular heart rate -he notes bowels are regular  -he notes he's getting more sleep than he used to -he notes sleeping for 5 hours  -he notes he's been having chronic leg cramps -he notes he's regretting his L knee replacement surgery due to limitations in his ROM. He now swims more slowly, and he's unable to run -he notes exercising (swimming once per week, elliptical with no limitations (7.5-8 miles per hour)). He takes Tadalafil before exercising -he notes he needs to see a hearing doctor due to tinnitus. His Sx began 2-3 years ago, and he started getting crackling in his ears. He's on a supplement, which has resolved some of the crackling, but the ringing remains -he notes numbness in his glutes/lower body when he sits for prolonged periods -he notes he's lost depth perception with close vision due to macular pucker  -he denies any headaches, nausea, emesis, fever, chills, sweats, chest pain, chest pressure, coughing, wheezing, palpitations, diarrhea, constipation, dysphagia, vertigo, leg swelling, itchy eyes, itchy ears, heartburn, reflux, or sour taste in the mouth.

## 2024-03-28 NOTE — ADDENDUM
[FreeTextEntry1] : Documented by Kalyani Ibarra acting as a scribe for Dr. Pato Knight on 03/28/2024. All medical record entries made by the Scribe were at my, Dr. Pato Knight's, direction and personally dictated by me on 03/28/2024. I have reviewed the chart and agree that the record accurately reflects my personal performance of the history, physical exam, assessment and plan. I have also personally directed, reviewed, and agree with the discharge instructions.

## 2024-03-28 NOTE — PHYSICAL EXAM
[No Acute Distress] : no acute distress [Normal Oropharynx] : normal oropharynx [II] : Mallampati Class: II [Normal Appearance] : normal appearance [No Neck Mass] : no neck mass [No Resp Distress] : no resp distress [Normal S1, S2] : normal s1, s2 [Clear to Auscultation Bilaterally] : clear to auscultation bilaterally [No Abnormalities] : no abnormalities [Normal Gait] : normal gait [Benign] : benign [No Clubbing] : no clubbing [No Cyanosis] : no cyanosis [No Edema] : no edema [FROM] : FROM [No Focal Deficits] : no focal deficits [Normal Color/ Pigmentation] : normal color/ pigmentation [Oriented x3] : oriented x3 [Normal Affect] : normal affect [TextBox_54] : irregular heartbeat, 2/6 systolic murmur [TextBox_68] : I:E 1:3; Clear

## 2024-06-28 NOTE — H&P PST ADULT - ATTENDING PHYSICIAN: I HAVE REVIEWED THE CLINICAL DOCUMENTATION AND AGREE WITH THE ABOVE NOTE
Detail Level: Zone Shampoo Recommendations: Selsun Blue or Nizoral 2-3 times per week Detail Level: Simple Statement Selected

## 2024-07-22 ENCOUNTER — APPOINTMENT (OUTPATIENT)
Dept: PULMONOLOGY | Facility: CLINIC | Age: 79
End: 2024-07-22
Payer: MEDICARE

## 2024-07-22 VITALS
TEMPERATURE: 96.3 F | WEIGHT: 155 LBS | RESPIRATION RATE: 16 BRPM | OXYGEN SATURATION: 98 % | HEIGHT: 68 IN | DIASTOLIC BLOOD PRESSURE: 82 MMHG | BODY MASS INDEX: 23.49 KG/M2 | HEART RATE: 78 BPM | SYSTOLIC BLOOD PRESSURE: 140 MMHG

## 2024-07-22 DIAGNOSIS — R06.02 SHORTNESS OF BREATH: ICD-10-CM

## 2024-07-22 DIAGNOSIS — G47.33 OBSTRUCTIVE SLEEP APNEA (ADULT) (PEDIATRIC): ICD-10-CM

## 2024-07-22 DIAGNOSIS — J30.9 ALLERGIC RHINITIS, UNSPECIFIED: ICD-10-CM

## 2024-07-22 DIAGNOSIS — I27.21 SECONDARY PULMONARY ARTERIAL HYPERTENSION: ICD-10-CM

## 2024-07-22 DIAGNOSIS — J45.909 UNSPECIFIED ASTHMA, UNCOMPLICATED: ICD-10-CM

## 2024-07-22 PROCEDURE — 99214 OFFICE O/P EST MOD 30 MIN: CPT | Mod: 25

## 2024-07-22 PROCEDURE — 94010 BREATHING CAPACITY TEST: CPT

## 2024-07-22 PROCEDURE — 94727 GAS DIL/WSHOT DETER LNG VOL: CPT

## 2024-07-22 RX ORDER — OLOPATADINE HYDROCHLORIDE AND MOMETASONE FUROATE 25; 665 UG/1; UG/1
665-25 SPRAY, METERED NASAL
Qty: 1 | Refills: 2 | Status: ACTIVE | COMMUNITY
Start: 2024-07-22 | End: 1900-01-01

## 2024-07-22 NOTE — ADDENDUM
[FreeTextEntry1] : Documented by Fercho Silver acting as a scribe for Dr. Pato Knight on 07/22/2024. All medical record entries made by the Scribe were at my, Dr. Pato Knight's, direction and personally dictated by me on 07/22/2024. I have reviewed the chart and agree that the record accurately reflects my personal performance of the history, physical exam, assessment and plan. I have also personally directed, reviewed, and agree with the discharge instructions.

## 2024-07-22 NOTE — HISTORY OF PRESENT ILLNESS
[FreeTextEntry1] : Mr. Knapp is an 78 year old male with a history of allergic rhinitis, SERAFIN, PAH, RAD, and SOB presenting to the office today for a follow-up pulmonary evaluation. His chief complaint is   -he notes difficulty exercising s/p knee surgery -he notes he has no difficulty swimming, but he feels very uncomfortable when running -he notes vision is stable (macular pucker in his L eye) -he notes balance is stable -he notes PNDrip -he notes weight is stable -he notes bowels are regular -he notes his stamina is his #1 issue -he notes his tinnitus persists  -he denies any headaches, nausea, emesis, fever, chills, sweats, chest pain, chest pressure, coughing, wheezing, palpitations, diarrhea, constipation, dysphagia, vertigo, arthralgias, myalgias, leg swelling, itchy eyes, itchy ears, heartburn, reflux, or sour taste in the mouth.

## 2024-07-22 NOTE — PHYSICAL EXAM
[No Acute Distress] : no acute distress [Normal Oropharynx] : normal oropharynx [II] : Mallampati Class: II [Normal Appearance] : normal appearance [No Neck Mass] : no neck mass [Normal S1, S2] : normal s1, s2 [No Resp Distress] : no resp distress [Clear to Auscultation Bilaterally] : clear to auscultation bilaterally [No Abnormalities] : no abnormalities [Benign] : benign [Normal Gait] : normal gait [No Clubbing] : no clubbing [No Cyanosis] : no cyanosis [No Edema] : no edema [FROM] : FROM [Normal Color/ Pigmentation] : normal color/ pigmentation [No Focal Deficits] : no focal deficits [Oriented x3] : oriented x3 [Normal Affect] : normal affect [TextBox_54] : irregular heartbeat, 2/6 systolic murmur [TextBox_68] : I:E 1:3; Clear

## 2024-07-22 NOTE — PROCEDURE
[FreeTextEntry1] : Full PFT reveals normal flows; FEV1 was  2.78L which is 105% of predicted; unable lung volumes; normal diffusion at 23.3, which is 143% of predicted; abnormal inspiratory limb. PFTs were performed to evaluate for SOB

## 2024-07-22 NOTE — ASSESSMENT
[FreeTextEntry1] : Mr. Knapp is an 78 year old male who has a h/o AF/flutter, tinnitus, macular pucker, allergy, low vitamin D, RAD, PAH, and SERAFIN who is now mildly compromised from a pulmonary perspective- macular pucker, orthopedic (still) - s/p left knee surgery - near baseline (elliptical)- but stamina issue (in progress)  His progressive SOB is currently stable and is multifactorial due to: -exercise -PAH -RADS -?cardiac disease -poor mechanics of breathing  problem 1: history of PAH, exercise induced (RHC @ rest - No PAH 9/2021) -previously failed:, Revatio, Adcirca, Calcium channel blockers, and Tracleer -on Adcirca 20 mg q event (Tadalafil) -considering Uptravi (IP Antagonist) / Tyvaso pre-exercise (or Ventavis) -Disorder of the pulmonary arteries, important to distinguish the difference between pulmonary arterial hypertension which is idiopathic to secondary pulmonary hypertension which is related to heart disease being diastolic dysfunction or congestive heart failure. Diagnostics include an initial echocardiogram evaluating the pulmonary artery pressures, if this is abnormal, to proceed with a VQ scan as well as a CTPA and an eventual right heart catheterization (No medication can be prescribed until the right heart catheterization). If present, the evaluation will include rheumatological blood testing, HIV testing, and potential evaluation for cirrhosis. Drug classes include PED5 (Revatio, Adcirca) ETRA (Tracleer, Macitentan, Letairis), Soluble guanylate cyclase (Adempas) Prostacyclins (Uptravi, Tyavso, Ventavis, Remodulin, or Orenitram derivatives).  problem 2: RADS; (FENO > 25) -s/p trial of new triple MDI -failed Utibron,Bevespi, Seebri, Spiriva, Incruse, Anoro, Duaklir, Breztri -continue to use Ventolin or Xopenex PRN and before exercise, PRN -Trial of Combivent 1 inhalation Q6H, PRN -Inhaler technique reviewed as well as oral hygiene techniques reviewed with patient. Avoidance of cold air, extremes of temperature, rescue inhaler should be used before exercise. Order of medication reviewed with patient. Recommended use of a cool mist humidifier in the bedroom.  Problem 2A: Allergies- pollen -continue Claritin 10 mg QD -add Ryaltris 1 sniff BID Environmental measures for allergies were encouraged including mattress and pillow cover, air purifier, and environmental controls.  problem 3: prior CTA -ruled out pulmonary embolism and any underlying disease  problem 4: cardiac component (Danyel) -continue to follow up with cardiologist - repeat cardiopulmonary stress test - pulmonary cardiac stress test was normal  problem 5: SERAFIN -continue to use Oxy-Aid by Respitec / "Sleep Rite" -recommended to try Boron supplements 6 mg BID -Sleep apnea is associated with adverse clinical consequences which an affect most organ systems. Cardiovascular disease risk includes arrhythmias, atrial fibrillation, hypertension, coronary artery disease, and stroke. Metabolic disorders include diabetes type 2, non-alcoholic fatty liver disease. Mood disorder especially depression; and cognitive decline especially in the elderly. Associations with chronic reflux/Marques's esophagus some but not all inclusive. -Reasons include arousal consistent with hypopnea; respiratory events most prominent in REM sleep or supine position; therefore sleep staging and body position are important for accurate diagnosis and estimation of AHI. -Treatment options discussed including CPAP/BiPAP machine, oral appliance, ProVent therapy, Oxy-Aid by Respitec, new technologies, or positional sleep.Recommended use of the CPAP machine for moderate (AHI >15), moderate to severe (AHI 15-30) and severe patients (AHI > 30). Recommended weight loss which can reduce AHI especially in weight loss of greater than 5% of BMI. Positional sleep is recommended in those with low AHI, low-moderate BMI, and younger age. For severe sleep apnea, the hypoglossal nerve stimulator was recommended as well.  problem 6: exercise (ortho limited) -s/p Dion Vive -recommended Qnol trial -recommended to transition from treadmill/swimming/tread water -recommended to use natural protein  problem 7: exercise induced pulmonary hypertension -he may qualify for prostacyclin drugs including Uptavi or Orenitram or Tyvaso (#1) (or Ventavis) -considering Uptavi (pending cardiopulmonary exercise stress test) / Tyvaso -Recommended to have an echocardiogram to evaluate for PAH with Dr. Wilkinson/Danyel  Problem 8: muscle cramps (improved) -recommended Neuro-Mag -continue MyoCalm PM -continue theraworx -continue to use Optimal Electrolytes (pre-exercise) -recommended Topricin ointment  problem 9: poor mechanics of breathing -recommended the Breather -recommended to use POWERbreathe Medic Plus IMT (30 reps, 2X per day)  -Recommended Aseal Benedict and Kamar breathing techniques -Proper breathing techniques were reviewed with an emphasis of exhalation. Patient instructed to breath in for 1 second and out for four seconds. Patient was encouraged to not talk while walking. -recommended to hydrate before exercise -recommended to use a heart rate monitor while exercising -recommended to use a natural protein powder  Problem 10: Health Maintenance/COVID19 Precautions: -s/p J&J COVID 19 vaccine  Immune Support Recommendations: -OTC Vitamin C 500mg BID -OTC Quercetin 250-500mg BID -OTC Zinc 75-100mg per day -OTC Melatonin 1 or 2 mg a night -OTC Vitamin D 1-4000mg per day -OTC Tonic Water 8oz per day  Asthma and COVID19: You need to make sure your asthma is under control. This often requires the use of inhaled corticosteroids (and sometimes oral corticosteroids). Inhaled corticosteroids do not likely reduce your immune system's ability to fight infections, but oral corticosteroids may. It is important to use the steps above to protect yourself to limit your exposure to any respiratory virus.  problem 11: health maintenance -recommended [facility] Stretches on [facility]  -Recommend SPM, turmeric, liposomal glutathione/ L-carnitine -Recommended to take No Flush Niacin and Lipoflavonoids for Tinnitus -Recommended to take Lobelia -recommended to try optimal electrolytes -recommended yearly flu shot after October 15, refused 2023 -recommended strep pneumonia vaccines: Prevnar-13 vaccine, followed by Pneumo vaccine 23 one year following (completed) -recommended early intervention for URIs -recommended regular osteoporosis evaluations -recommended early dermatological evaluations -recommended after the age of 50 to the age of 70, colonoscopy every 5 years - recommend Vitamin D supplements - recommend probiotic  F/P in 4 months He is encouraged to call with any changes, concerns, or questions.

## 2024-08-27 NOTE — OCCUPATIONAL THERAPY INITIAL EVALUATION ADULT - LEVEL OF INDEPENDENCE: SIT/STAND, REHAB EVAL
Subjective  Patient seen and examined.    NIL  COMPLAINTS    Objective    Last Recorded Vitals  Blood pressure 100/67, pulse 82, temperature 97.2 °F (36.2 °C), temperature source Oral, resp. rate 14, height 5' 11\" (1.803 m), weight 75.3 kg (166 lb 0.1 oz), SpO2 98%.  Body mass index is 23.15 kg/m².    Physical Exam  LUNGS  CLEAR  HEART  S1 S2  ABDOMEN  NON  TENDER  EXT:  NO PEDAL EDEMA    Labs   Recent Labs   Lab 08/27/24  0757 08/26/24  0623 08/25/24  0612 08/23/24  1752   WBC 9.2 9.8 6.6 3.5*   HGB 15.6 15.0 15.8 12.1*   HCT 47.0 44.4 47.0 37.2*   MCV 95.1 93.1 93.6 97.1    216 207 155   SODIUM 133* 132* 133* 140   POTASSIUM 4.7 5.1 5.0 5.5*   CHLORIDE 98 98 99 105   CO2 22 21 24 24   BUN 75* 74* 60* 30*   CREATININE 1.56* 1.51* 1.63* 1.61*   GLUCOSE 194* 172* 143* 112*   CALCIUM 9.1 9.0 9.1 9.0   ALBUMIN  --   --   --  3.4*   AST  --   --   --  41*   GPT  --   --   --  23   BILIRUBIN  --   --   --  2.2*         Assessment and Plan  Patient Active Problem List   Diagnosis    Hypertensive heart disease with chronic diastolic congestive heart failure  (CMD)    HTN (hypertension)    SOB (shortness of breath)    Nonischemic cardiomyopathy  (CMD)    Noncompliance with treatment plan    COPD exacerbation  (CMD)    Acute respiratory failure with hypoxia  (CMD)    Severe sepsis  (CMD)    Influenza A with respiratory manifestations    Hyperbilirubinemia    Hyponatremia    Hospital-acquired pneumonia    Chest pain    Acute on chronic combined systolic (congestive) and diastolic (congestive) heart failure  (CMD)    Elevated brain natriuretic peptide (BNP) level    Acute on chronic congestive heart failure, unspecified heart failure type  (CMD)    Cardiac volume overload    Dyspnea    Ventricular tachycardia  (CMD)    TAHMINA (acute kidney injury) (CMD)             ACUTE ON  CHRONIC  SYSTOLIC  HEART  FAILURE  HTN  GOUTY  ARTHROPATHY  CKD           INPUT OF  ALL THE  CONSULTANTS  NOTED  WITH  THANKS  CONT   DIURETICS  WILL   DISCHARGE PATIENT  HOME                                   contact guard

## 2024-12-20 ENCOUNTER — APPOINTMENT (OUTPATIENT)
Dept: PULMONOLOGY | Facility: CLINIC | Age: 79
End: 2024-12-20
Payer: MEDICARE

## 2024-12-20 VITALS
WEIGHT: 155 LBS | SYSTOLIC BLOOD PRESSURE: 122 MMHG | TEMPERATURE: 97.6 F | BODY MASS INDEX: 23.49 KG/M2 | HEIGHT: 68 IN | RESPIRATION RATE: 16 BRPM | DIASTOLIC BLOOD PRESSURE: 80 MMHG

## 2024-12-20 DIAGNOSIS — J45.909 UNSPECIFIED ASTHMA, UNCOMPLICATED: ICD-10-CM

## 2024-12-20 DIAGNOSIS — J30.9 ALLERGIC RHINITIS, UNSPECIFIED: ICD-10-CM

## 2024-12-20 DIAGNOSIS — I27.21 SECONDARY PULMONARY ARTERIAL HYPERTENSION: ICD-10-CM

## 2024-12-20 DIAGNOSIS — R06.02 SHORTNESS OF BREATH: ICD-10-CM

## 2024-12-20 DIAGNOSIS — G47.33 OBSTRUCTIVE SLEEP APNEA (ADULT) (PEDIATRIC): ICD-10-CM

## 2024-12-20 PROCEDURE — 94727 GAS DIL/WSHOT DETER LNG VOL: CPT

## 2024-12-20 PROCEDURE — 94729 DIFFUSING CAPACITY: CPT

## 2024-12-20 PROCEDURE — 99214 OFFICE O/P EST MOD 30 MIN: CPT | Mod: 25

## 2024-12-20 PROCEDURE — 94010 BREATHING CAPACITY TEST: CPT

## 2024-12-20 PROCEDURE — ZZZZZ: CPT

## 2024-12-20 PROCEDURE — 95012 NITRIC OXIDE EXP GAS DETER: CPT

## 2024-12-20 PROCEDURE — 94799 UNLISTED PULMONARY SVC/PX: CPT

## 2024-12-30 NOTE — ASU PATIENT PROFILE, ADULT - MENTAL HEALTH CONDITIONS/SYMPTOMS, PROFILE
none [Patient Intake Form Reviewed] : Patient intake form was reviewed [Fatigue] : fatigue [Dyspnea on Exertion] : dyspnea on exertion [Abdominal Pain] : abdominal pain [Nausea] : nausea [Constipation] : constipation [Vomiting] : vomiting [Heartburn] : heartburn [Melena] : melsamir [Joint Pain] : joint pain [Anxiety] : anxiety [Negative] : Heme/Lymph [Fever] : no fever [Chills] : no chills [Chest Pain] : no chest pain [Palpitations] : no palpitations [Claudication] : no  leg claudication [Lower Ext Edema] : no lower extremity edema [Orthopena] : no orthopnea [Paroxysmal Nocturnal Dyspnea] : no paroxysmal nocturnal dyspnea [Shortness Of Breath] : no shortness of breath [Wheezing] : no wheezing [Cough] : no cough [Diarrhea] : no diarrhea [Dysuria] : no dysuria [Incontinence] : no incontinence [Hesitancy] : no hesitancy [Nocturia] : no nocturia [Hematuria] : no hematuria [Frequency] : no frequency [Impotence] : no impotency [Poor Libido] : libido not poor [Suicidal] : not suicidal [Insomnia] : no insomnia [Depression] : no depression [FreeTextEntry2] : Overweight [FreeTextEntry5] : CHF  [FreeTextEntry6] : obesity and CHF  [FreeTextEntry7] : GERD, stomach pain with eating  [FreeTextEntry8] : BPH, Urine very dark, sometimes light brown.  [FreeTextEntry9] : chronic knee pain  [FreeTextEntry1] : Low Vit. D, DM Borderline

## 2025-01-23 NOTE — ASSESSMENT
[FreeTextEntry1] : Mr. Knapp is an 78 year old male who has a h/o AF/flutter, macular pucker, allergy, low vitamin D, RAD, PAH, and SERAFIN who is now mildly compromised from a pulmonary perspective- macular pucker, orthopedic (still) - s/p left knee surgery - near baseline (elliptical)- compromised by tinnitus  His progressive SOB is currently stable and is multifactorial due to: -exercise -PAH -RADS -?cardiac disease -poor mechanics of breathing  problem 1: history of PAH, exercise induced (RHC @ rest - No PAH 9/2021) -previously failed:, Revatio, Adcirca, Calcium channel blockers, and Tracleer -on Adcirca 20 mg q event (Tadalafil) -considering Uptravi (IP Antagonist) / Tyvaso pre-exercise (or Ventavis) -Disorder of the pulmonary arteries, important to distinguish the difference between pulmonary arterial hypertension which is idiopathic to secondary pulmonary hypertension which is related to heart disease being diastolic dysfunction or congestive heart failure. Diagnostics include an initial echocardiogram evaluating the pulmonary artery pressures, if this is abnormal, to proceed with a VQ scan as well as a CTPA and an eventual right heart catheterization (No medication can be prescribed until the right heart catheterization). If present, the evaluation will include rheumatological blood testing, HIV testing, and potential evaluation for cirrhosis. Drug classes include PED5 (Revatio, Adcirca) ETRA (Tracleer, Macitentan, Letairis), Soluble guanylate cyclase (Adempas) Prostacyclins (Uptravi, Tyavso, Ventavis, Remodulin, or Orenitram derivatives).  problem 2: RADS; (FENO > 25) -s/p trial of new triple MDI -failed Utibron,Bevespi, Seebri, Spiriva, Incruse, Anoro, Duaklir, Breztri -continue to use Ventolin or Xopenex PRN and before exercise, PRN -Trial of Combivent 1 inhalation Q6H, PRN -Inhaler technique reviewed as well as oral hygiene techniques reviewed with patient. Avoidance of cold air, extremes of temperature, rescue inhaler should be used before exercise. Order of medication reviewed with patient. Recommended use of a cool mist humidifier in the bedroom.  Problem 2A: Allergies- pollen -continue Claritin 10 mg QD Environmental measures for allergies were encouraged including mattress and pillow cover, air purifier, and environmental controls.  problem 3: prior CTA -ruled out pulmonary embolism and any underlying disease  problem 4: cardiac component (Danyel) -continue to follow up with cardiologist - repeat cardiopulmonary stress test - pulmonary cardiac stress test was normal  problem 5: SERAFIN -continue to use Oxy-Aid by Respitec / "Sleep Rite" -recommended to try Boron supplements 6 mg BID -Sleep apnea is associated with adverse clinical consequences which an affect most organ systems. Cardiovascular disease risk includes arrhythmias, atrial fibrillation, hypertension, coronary artery disease, and stroke. Metabolic disorders include diabetes type 2, non-alcoholic fatty liver disease. Mood disorder especially depression; and cognitive decline especially in the elderly. Associations with chronic reflux/Marques's esophagus some but not all inclusive. -Reasons include arousal consistent with hypopnea; respiratory events most prominent in REM sleep or supine position; therefore sleep staging and body position are important for accurate diagnosis and estimation of AHI. -Treatment options discussed including CPAP/BiPAP machine, oral appliance, ProVent therapy, Oxy-Aid by Respitec, new technologies, or positional sleep.Recommended use of the CPAP machine for moderate (AHI >15), moderate to severe (AHI 15-30) and severe patients (AHI > 30). Recommended weight loss which can reduce AHI especially in weight loss of greater than 5% of BMI. Positional sleep is recommended in those with low AHI, low-moderate BMI, and younger age. For severe sleep apnea, the hypoglossal nerve stimulator was recommended as well.  problem 6: exercise (ortho limited) -s/p Dion Vive -recommended Qnol trial -recommended to transition from treadmill/swimming/tread water -recommended to use natural protein  problem 7: exercise induced pulmonary hypertension -he may qualify for prostacyclin drugs including Uptavi or Orenitram or Tyvaso (#1) (or Ventavis) -considering Uptavi (pending cardiopulmonary exercise stress test) / Tyvaso -Recommended to have an echocardiogram to evaluate for PAH with Dr. Wilkinson/Danyel  Problem 8: muscle cramps (improved) -recommended Neuro-Mag -continue MyoCalm PM -continue theraworx -continue to use Optimal Electrolytes (pre-exercise) -recommended Topricin ointment  problem 9: poor mechanics of breathing -recommended the Breather -recommended to use POWERQuyi NetworkathMobilygen Medic Plus IMT (30 reps, 2X per day)  -Recommended Asael Jeter breathing techniques -Proper breathing techniques were reviewed with an emphasis of exhalation. Patient instructed to breath in for 1 second and out for four seconds. Patient was encouraged to not talk while walking. -recommended to hydrate before exercise -recommended to use a heart rate monitor while exercising -recommended to use a natural protein powder  Problem 10: Health Maintenance/COVID19 Precautions: -s/p J&J COVID 19 vaccine  Immune Support Recommendations: -OTC Vitamin C 500mg BID -OTC Quercetin 250-500mg BID -OTC Zinc 75-100mg per day -OTC Melatonin 1 or 2 mg a night -OTC Vitamin D 1-4000mg per day -OTC Tonic Water 8oz per day  Asthma and COVID19: You need to make sure your asthma is under control. This often requires the use of inhaled corticosteroids (and sometimes oral corticosteroids). Inhaled corticosteroids do not likely reduce your immune system's ability to fight infections, but oral corticosteroids may. It is important to use the steps above to protect yourself to limit your exposure to any respiratory virus.  problem 11: health maintenance -recommended Gregorio Root [facility] Stretches on YouTube  -Recommend SPM, turmeric, liposomal glutathione/ L-carnitine -Recommended to take No Flush Niacin and Lipoflavonoids for Tinnitus -Recommended to take Lobelia -recommended to try optimal electrolytes -recommended yearly flu shot after October 15, refused 2023 -recommended strep pneumonia vaccines: Prevnar-13 vaccine, followed by Pneumo vaccine 23 one year following (completed) -recommended early intervention for URIs -recommended regular osteoporosis evaluations -recommended early dermatological evaluations -recommended after the age of 50 to the age of 70, colonoscopy every 5 years - recommend Vitamin D supplements - recommend probiotic  F/P in 4 months He is encouraged to call with any changes, concerns, or questions. fall rolling walker

## 2025-02-13 ENCOUNTER — APPOINTMENT (OUTPATIENT)
Dept: ORTHOPEDIC SURGERY | Facility: CLINIC | Age: 80
End: 2025-02-13
Payer: MEDICARE

## 2025-02-13 DIAGNOSIS — M17.11 UNILATERAL PRIMARY OSTEOARTHRITIS, RIGHT KNEE: ICD-10-CM

## 2025-02-13 DIAGNOSIS — Z96.652 PRESENCE OF LEFT ARTIFICIAL KNEE JOINT: ICD-10-CM

## 2025-02-13 DIAGNOSIS — M79.18 MYALGIA, OTHER SITE: ICD-10-CM

## 2025-02-13 PROCEDURE — 99214 OFFICE O/P EST MOD 30 MIN: CPT

## 2025-02-13 PROCEDURE — 73564 X-RAY EXAM KNEE 4 OR MORE: CPT | Mod: 50

## 2025-02-13 RX ORDER — NAPROXEN 500 MG/1
500 TABLET ORAL TWICE DAILY
Qty: 30 | Refills: 0 | Status: ACTIVE | COMMUNITY
Start: 2025-02-13 | End: 1900-01-01

## 2025-02-18 ENCOUNTER — NON-APPOINTMENT (OUTPATIENT)
Age: 80
End: 2025-02-18

## 2025-06-20 ENCOUNTER — APPOINTMENT (OUTPATIENT)
Dept: PULMONOLOGY | Facility: CLINIC | Age: 80
End: 2025-06-20
Payer: MEDICARE

## 2025-06-20 VITALS
WEIGHT: 155 LBS | HEART RATE: 91 BPM | BODY MASS INDEX: 23.49 KG/M2 | SYSTOLIC BLOOD PRESSURE: 118 MMHG | TEMPERATURE: 97.88 F | DIASTOLIC BLOOD PRESSURE: 64 MMHG | RESPIRATION RATE: 16 BRPM | OXYGEN SATURATION: 97 % | HEIGHT: 68 IN

## 2025-06-20 PROCEDURE — 94727 GAS DIL/WSHOT DETER LNG VOL: CPT

## 2025-06-20 PROCEDURE — 99214 OFFICE O/P EST MOD 30 MIN: CPT | Mod: 25

## 2025-06-20 PROCEDURE — 95012 NITRIC OXIDE EXP GAS DETER: CPT

## 2025-06-20 PROCEDURE — ZZZZZ: CPT

## 2025-06-20 PROCEDURE — 94729 DIFFUSING CAPACITY: CPT

## 2025-06-20 PROCEDURE — 94010 BREATHING CAPACITY TEST: CPT

## 2025-06-25 ENCOUNTER — APPOINTMENT (OUTPATIENT)
Dept: CT IMAGING | Facility: CLINIC | Age: 80
End: 2025-06-25

## 2025-06-25 PROCEDURE — 71250 CT THORAX DX C-: CPT

## (undated) DEVICE — SUT VLOC 90 4-0 18" P-12 UNDYED

## (undated) DEVICE — SAW BLADE STRYKER SAGITTAL 25X98.5X1.24MM

## (undated) DEVICE — DRSG STOCKINETTE TUBULAR COTTON 1PLY 6X72"

## (undated) DEVICE — SAW BLADE STRYKER SAGITTAL DUAL CUT 18MMX100MMX1.27MM

## (undated) DEVICE — WOUND IRR IRRISEPT W 0.5 CHG

## (undated) DEVICE — PACK TOTAL KNEE

## (undated) DEVICE — NDL HYPO SAFE 20G X 1.5" (YELLOW)

## (undated) DEVICE — DRSG PICO NPWT 4X12"

## (undated) DEVICE — STRYKER MIXEVAC 3 BONE CEMENT MIXER

## (undated) DEVICE — SUCTION YANKAUER TAPERED BULBOUS NO VENT

## (undated) DEVICE — HOOD FLYTE STRYKER HELMET SHIELD

## (undated) DEVICE — GLV 8 PROTEXIS (WHITE)

## (undated) DEVICE — SUT VICRYL 1 36" CTX UNDYED

## (undated) DEVICE — DRAPE 3/4 SHEET 52X76"

## (undated) DEVICE — HOOD T5 PEELAWAY

## (undated) DEVICE — PLASTIC SOLUTION BOWL 160Z

## (undated) DEVICE — PREP CHLORAPREP HI-LITE ORANGE 26ML

## (undated) DEVICE — CRYO/CUFF GRAVITY COOLER KNEE LARGE

## (undated) DEVICE — GLV 8 PROTEXIS (BLUE)

## (undated) DEVICE — GOWN IMPERV BREATHABLE XL

## (undated) DEVICE — ZIMMER PULSAVAC PLUS FAN KIT

## (undated) DEVICE — SYR LUER LOK 20CC

## (undated) DEVICE — DRAPE STERI-DRAPE INCISE 32X33"

## (undated) DEVICE — DRAPE 1/2 SHEET 40X57"

## (undated) DEVICE — SAW BLADE STRYKER WIDE MED 25MM X 73MM X 0.89MM

## (undated) DEVICE — SAW BLADE STRYKER SAGITTAL DUAL CUT WITH FAN

## (undated) DEVICE — DRSG WEBRIL 6"

## (undated) DEVICE — DRAPE MAYO STAND 30"

## (undated) DEVICE — SUT VICRYL 2-0 36" CT-1 UNDYED

## (undated) DEVICE — SOL IRR BAG NS 0.9% 3000ML

## (undated) DEVICE — DRSG STOCKINETTE TUBULAR COTTON 2PLY 6X72"

## (undated) DEVICE — WARMING BLANKET UPPER ADULT